# Patient Record
Sex: FEMALE | Race: WHITE | NOT HISPANIC OR LATINO | URBAN - METROPOLITAN AREA
[De-identification: names, ages, dates, MRNs, and addresses within clinical notes are randomized per-mention and may not be internally consistent; named-entity substitution may affect disease eponyms.]

---

## 2017-05-08 ENCOUNTER — EMERGENCY (EMERGENCY)
Facility: HOSPITAL | Age: 62
LOS: 1 days | Discharge: HOME | End: 2017-05-08
Attending: EMERGENCY MEDICINE
Payer: COMMERCIAL

## 2017-05-08 VITALS
DIASTOLIC BLOOD PRESSURE: 69 MMHG | SYSTOLIC BLOOD PRESSURE: 148 MMHG | OXYGEN SATURATION: 99 % | TEMPERATURE: 98 F | RESPIRATION RATE: 20 BRPM | HEIGHT: 65 IN | HEART RATE: 61 BPM | WEIGHT: 209 LBS

## 2017-05-08 DIAGNOSIS — R19.7 DIARRHEA, UNSPECIFIED: ICD-10-CM

## 2017-05-08 DIAGNOSIS — R11.0 NAUSEA: ICD-10-CM

## 2017-05-08 LAB
ALBUMIN SERPL ELPH-MCNC: 3.9 G/DL — SIGNIFICANT CHANGE UP (ref 3.5–5)
ALP SERPL-CCNC: 60 U/L — SIGNIFICANT CHANGE UP (ref 40–120)
ALT FLD-CCNC: 61 U/L DA — HIGH (ref 10–60)
ANION GAP SERPL CALC-SCNC: 9 MMOL/L — SIGNIFICANT CHANGE UP (ref 5–17)
AST SERPL-CCNC: 64 U/L — HIGH (ref 10–40)
BASOPHILS # BLD AUTO: 0.1 K/UL — SIGNIFICANT CHANGE UP (ref 0–0.2)
BASOPHILS NFR BLD AUTO: 1.5 % — SIGNIFICANT CHANGE UP (ref 0–2)
BILIRUB SERPL-MCNC: 0.8 MG/DL — SIGNIFICANT CHANGE UP (ref 0.2–1.2)
BUN SERPL-MCNC: 10 MG/DL — SIGNIFICANT CHANGE UP (ref 7–18)
CALCIUM SERPL-MCNC: 9.4 MG/DL — SIGNIFICANT CHANGE UP (ref 8.4–10.5)
CHLORIDE SERPL-SCNC: 105 MMOL/L — SIGNIFICANT CHANGE UP (ref 96–108)
CO2 SERPL-SCNC: 26 MMOL/L — SIGNIFICANT CHANGE UP (ref 22–31)
CREAT SERPL-MCNC: 1 MG/DL — SIGNIFICANT CHANGE UP (ref 0.5–1.3)
EOSINOPHIL # BLD AUTO: 0.1 K/UL — SIGNIFICANT CHANGE UP (ref 0–0.5)
EOSINOPHIL NFR BLD AUTO: 1.3 % — SIGNIFICANT CHANGE UP (ref 0–6)
GLUCOSE SERPL-MCNC: 77 MG/DL — SIGNIFICANT CHANGE UP (ref 70–99)
HCT VFR BLD CALC: 46.1 % — HIGH (ref 34.5–45)
HGB BLD-MCNC: 15.7 G/DL — HIGH (ref 11.5–15.5)
LIDOCAIN IGE QN: 152 U/L — SIGNIFICANT CHANGE UP (ref 73–393)
LYMPHOCYTES # BLD AUTO: 3.2 K/UL — SIGNIFICANT CHANGE UP (ref 1–3.3)
LYMPHOCYTES # BLD AUTO: 34.2 % — SIGNIFICANT CHANGE UP (ref 13–44)
MCHC RBC-ENTMCNC: 29.4 PG — SIGNIFICANT CHANGE UP (ref 27–34)
MCHC RBC-ENTMCNC: 34 GM/DL — SIGNIFICANT CHANGE UP (ref 32–36)
MCV RBC AUTO: 86.6 FL — SIGNIFICANT CHANGE UP (ref 80–100)
MONOCYTES # BLD AUTO: 0.7 K/UL — SIGNIFICANT CHANGE UP (ref 0–0.9)
MONOCYTES NFR BLD AUTO: 7.1 % — SIGNIFICANT CHANGE UP (ref 2–14)
NEUTROPHILS # BLD AUTO: 5.3 K/UL — SIGNIFICANT CHANGE UP (ref 1.8–7.4)
NEUTROPHILS NFR BLD AUTO: 55.9 % — SIGNIFICANT CHANGE UP (ref 43–77)
PLATELET # BLD AUTO: 310 K/UL — SIGNIFICANT CHANGE UP (ref 150–400)
POTASSIUM SERPL-MCNC: 3.8 MMOL/L — SIGNIFICANT CHANGE UP (ref 3.5–5.3)
POTASSIUM SERPL-SCNC: 3.8 MMOL/L — SIGNIFICANT CHANGE UP (ref 3.5–5.3)
PROT SERPL-MCNC: 8.4 G/DL — HIGH (ref 6–8.3)
RBC # BLD: 5.33 M/UL — HIGH (ref 3.8–5.2)
RBC # FLD: 11.3 % — SIGNIFICANT CHANGE UP (ref 10.3–14.5)
SODIUM SERPL-SCNC: 140 MMOL/L — SIGNIFICANT CHANGE UP (ref 135–145)
WBC # BLD: 9.5 K/UL — SIGNIFICANT CHANGE UP (ref 3.8–10.5)
WBC # FLD AUTO: 9.5 K/UL — SIGNIFICANT CHANGE UP (ref 3.8–10.5)

## 2017-05-08 PROCEDURE — 99284 EMERGENCY DEPT VISIT MOD MDM: CPT | Mod: 25

## 2017-05-08 PROCEDURE — 87493 C DIFF AMPLIFIED PROBE: CPT

## 2017-05-08 PROCEDURE — 96374 THER/PROPH/DIAG INJ IV PUSH: CPT

## 2017-05-08 PROCEDURE — 80053 COMPREHEN METABOLIC PANEL: CPT

## 2017-05-08 PROCEDURE — 74177 CT ABD & PELVIS W/CONTRAST: CPT | Mod: 26

## 2017-05-08 PROCEDURE — 74177 CT ABD & PELVIS W/CONTRAST: CPT

## 2017-05-08 PROCEDURE — 99284 EMERGENCY DEPT VISIT MOD MDM: CPT

## 2017-05-08 PROCEDURE — 85027 COMPLETE CBC AUTOMATED: CPT

## 2017-05-08 PROCEDURE — 83690 ASSAY OF LIPASE: CPT

## 2017-05-08 RX ORDER — METRONIDAZOLE 500 MG
1 TABLET ORAL
Qty: 20 | Refills: 0 | OUTPATIENT
Start: 2017-05-08 | End: 2017-05-13

## 2017-05-08 RX ORDER — CIPROFLOXACIN LACTATE 400MG/40ML
500 VIAL (ML) INTRAVENOUS ONCE
Qty: 0 | Refills: 0 | Status: COMPLETED | OUTPATIENT
Start: 2017-05-08 | End: 2017-05-08

## 2017-05-08 RX ORDER — METRONIDAZOLE 500 MG
1 TABLET ORAL
Qty: 20 | Refills: 0
Start: 2017-05-08 | End: 2017-05-13

## 2017-05-08 RX ORDER — METRONIDAZOLE 500 MG
500 TABLET ORAL ONCE
Qty: 0 | Refills: 0 | Status: COMPLETED | OUTPATIENT
Start: 2017-05-08 | End: 2017-05-08

## 2017-05-08 RX ORDER — SODIUM CHLORIDE 9 MG/ML
1000 INJECTION INTRAMUSCULAR; INTRAVENOUS; SUBCUTANEOUS ONCE
Qty: 0 | Refills: 0 | Status: COMPLETED | OUTPATIENT
Start: 2017-05-08 | End: 2017-05-08

## 2017-05-08 RX ORDER — SODIUM CHLORIDE 9 MG/ML
3 INJECTION INTRAMUSCULAR; INTRAVENOUS; SUBCUTANEOUS ONCE
Qty: 0 | Refills: 0 | Status: COMPLETED | OUTPATIENT
Start: 2017-05-08 | End: 2017-05-08

## 2017-05-08 RX ORDER — ONDANSETRON 8 MG/1
4 TABLET, FILM COATED ORAL ONCE
Qty: 0 | Refills: 0 | Status: COMPLETED | OUTPATIENT
Start: 2017-05-08 | End: 2017-05-08

## 2017-05-08 RX ORDER — MOXIFLOXACIN HYDROCHLORIDE TABLETS, 400 MG 400 MG/1
1 TABLET, FILM COATED ORAL
Qty: 10 | Refills: 0 | OUTPATIENT
Start: 2017-05-08 | End: 2017-05-13

## 2017-05-08 RX ORDER — MOXIFLOXACIN HYDROCHLORIDE TABLETS, 400 MG 400 MG/1
1 TABLET, FILM COATED ORAL
Qty: 10 | Refills: 0
Start: 2017-05-08 | End: 2017-05-13

## 2017-05-08 RX ADMIN — SODIUM CHLORIDE 1000 MILLILITER(S): 9 INJECTION INTRAMUSCULAR; INTRAVENOUS; SUBCUTANEOUS at 20:45

## 2017-05-08 RX ADMIN — Medication 500 MILLIGRAM(S): at 23:20

## 2017-05-08 RX ADMIN — ONDANSETRON 4 MILLIGRAM(S): 8 TABLET, FILM COATED ORAL at 20:45

## 2017-05-08 RX ADMIN — SODIUM CHLORIDE 3 MILLILITER(S): 9 INJECTION INTRAMUSCULAR; INTRAVENOUS; SUBCUTANEOUS at 20:45

## 2017-05-08 NOTE — ED PROVIDER NOTE - NS ED MD SCRIBE ATTENDING SCRIBE SECTIONS
PHYSICAL EXAM/DISPOSITION/VITAL SIGNS( Pullset)/HIV/HISTORY OF PRESENT ILLNESS/REVIEW OF SYSTEMS/PAST MEDICAL/SURGICAL/SOCIAL HISTORY

## 2017-05-08 NOTE — ED PROVIDER NOTE - CONDUCTED A DETAILED DISCUSSION WITH PATIENT AND/OR GUARDIAN REGARDING, MDM
return to ED if symptoms worsen, persist or questions arise lab results/need for outpatient follow-up/return to ED if symptoms worsen, persist or questions arise

## 2017-05-08 NOTE — ED PROVIDER NOTE - OBJECTIVE STATEMENT
61 y/o F w/ no significant PMHx presents to the ED c/o diarrhea x12 days. Pt also notes slight nausea. Pt notes she ate yogurt & a granola bar s/p work prior to Sx. Pt states 2 hours ago she noticed bleeding while making a BM & went to PMD who did rectal exam & found bright red blood in stool. Pt was then sent to ED for further evaluation. Pt denies fever, chills, abd pain or any other complaints. NKDA.

## 2017-05-08 NOTE — ED ADULT TRIAGE NOTE - CHIEF COMPLAINT QUOTE
c/o watery diarrhea x 5 days states that she took anti diarrhea got better but came back again sent by PMD for evaluation

## 2017-05-09 LAB
C DIFF BY PCR RESULT: SIGNIFICANT CHANGE UP
C DIFF TOX GENS STL QL NAA+PROBE: SIGNIFICANT CHANGE UP

## 2017-05-09 RX ORDER — CIPROFLOXACIN LACTATE 400MG/40ML
1 VIAL (ML) INTRAVENOUS
Qty: 10 | Refills: 0
Start: 2017-05-09 | End: 2017-05-14

## 2017-05-09 RX ORDER — METRONIDAZOLE 500 MG
1 TABLET ORAL
Qty: 20 | Refills: 0
Start: 2017-05-09 | End: 2017-05-14

## 2017-05-10 ENCOUNTER — TRANSCRIPTION ENCOUNTER (OUTPATIENT)
Age: 62
End: 2017-05-10

## 2018-02-06 ENCOUNTER — RESULT REVIEW (OUTPATIENT)
Age: 63
End: 2018-02-06

## 2018-07-07 ENCOUNTER — EMERGENCY (EMERGENCY)
Facility: HOSPITAL | Age: 63
LOS: 1 days | Discharge: HOME | End: 2018-07-07
Attending: EMERGENCY MEDICINE
Payer: COMMERCIAL

## 2018-07-07 VITALS
SYSTOLIC BLOOD PRESSURE: 138 MMHG | HEART RATE: 79 BPM | DIASTOLIC BLOOD PRESSURE: 82 MMHG | WEIGHT: 214.95 LBS | RESPIRATION RATE: 18 BRPM | HEIGHT: 65 IN | TEMPERATURE: 98 F | OXYGEN SATURATION: 97 %

## 2018-07-07 VITALS
TEMPERATURE: 99 F | DIASTOLIC BLOOD PRESSURE: 80 MMHG | SYSTOLIC BLOOD PRESSURE: 137 MMHG | RESPIRATION RATE: 17 BRPM | HEART RATE: 77 BPM | OXYGEN SATURATION: 98 %

## 2018-07-07 LAB
ALBUMIN SERPL ELPH-MCNC: 3.7 G/DL — SIGNIFICANT CHANGE UP (ref 3.5–5)
ALP SERPL-CCNC: 57 U/L — SIGNIFICANT CHANGE UP (ref 40–120)
ALT FLD-CCNC: 58 U/L DA — SIGNIFICANT CHANGE UP (ref 10–60)
ANION GAP SERPL CALC-SCNC: 9 MMOL/L — SIGNIFICANT CHANGE UP (ref 5–17)
APPEARANCE UR: CLEAR — SIGNIFICANT CHANGE UP
AST SERPL-CCNC: 57 U/L — HIGH (ref 10–40)
BASOPHILS # BLD AUTO: 0.2 K/UL — SIGNIFICANT CHANGE UP (ref 0–0.2)
BASOPHILS NFR BLD AUTO: 1.8 % — SIGNIFICANT CHANGE UP (ref 0–2)
BILIRUB SERPL-MCNC: 0.7 MG/DL — SIGNIFICANT CHANGE UP (ref 0.2–1.2)
BILIRUB UR-MCNC: NEGATIVE — SIGNIFICANT CHANGE UP
BUN SERPL-MCNC: 10 MG/DL — SIGNIFICANT CHANGE UP (ref 7–18)
CALCIUM SERPL-MCNC: 8.9 MG/DL — SIGNIFICANT CHANGE UP (ref 8.4–10.5)
CHLORIDE SERPL-SCNC: 102 MMOL/L — SIGNIFICANT CHANGE UP (ref 96–108)
CO2 SERPL-SCNC: 25 MMOL/L — SIGNIFICANT CHANGE UP (ref 22–31)
COLOR SPEC: YELLOW — SIGNIFICANT CHANGE UP
CREAT SERPL-MCNC: 1.07 MG/DL — SIGNIFICANT CHANGE UP (ref 0.5–1.3)
DIFF PNL FLD: ABNORMAL
EOSINOPHIL # BLD AUTO: 0.2 K/UL — SIGNIFICANT CHANGE UP (ref 0–0.5)
EOSINOPHIL NFR BLD AUTO: 2.4 % — SIGNIFICANT CHANGE UP (ref 0–6)
GLUCOSE SERPL-MCNC: 90 MG/DL — SIGNIFICANT CHANGE UP (ref 70–99)
GLUCOSE UR QL: NEGATIVE — SIGNIFICANT CHANGE UP
HCT VFR BLD CALC: 45.7 % — HIGH (ref 34.5–45)
HGB BLD-MCNC: 15.2 G/DL — SIGNIFICANT CHANGE UP (ref 11.5–15.5)
KETONES UR-MCNC: ABNORMAL
LACTATE SERPL-SCNC: 1 MMOL/L — SIGNIFICANT CHANGE UP (ref 0.7–2)
LEUKOCYTE ESTERASE UR-ACNC: NEGATIVE — SIGNIFICANT CHANGE UP
LIDOCAIN IGE QN: 480 U/L — HIGH (ref 73–393)
LYMPHOCYTES # BLD AUTO: 2.5 K/UL — SIGNIFICANT CHANGE UP (ref 1–3.3)
LYMPHOCYTES # BLD AUTO: 27 % — SIGNIFICANT CHANGE UP (ref 13–44)
MCHC RBC-ENTMCNC: 28.4 PG — SIGNIFICANT CHANGE UP (ref 27–34)
MCHC RBC-ENTMCNC: 33.2 GM/DL — SIGNIFICANT CHANGE UP (ref 32–36)
MCV RBC AUTO: 85.6 FL — SIGNIFICANT CHANGE UP (ref 80–100)
MONOCYTES # BLD AUTO: 0.8 K/UL — SIGNIFICANT CHANGE UP (ref 0–0.9)
MONOCYTES NFR BLD AUTO: 8.9 % — SIGNIFICANT CHANGE UP (ref 2–14)
NEUTROPHILS # BLD AUTO: 5.6 K/UL — SIGNIFICANT CHANGE UP (ref 1.8–7.4)
NEUTROPHILS NFR BLD AUTO: 59.9 % — SIGNIFICANT CHANGE UP (ref 43–77)
NITRITE UR-MCNC: NEGATIVE — SIGNIFICANT CHANGE UP
PH UR: 6 — SIGNIFICANT CHANGE UP (ref 5–8)
PLATELET # BLD AUTO: 362 K/UL — SIGNIFICANT CHANGE UP (ref 150–400)
POTASSIUM SERPL-MCNC: 4 MMOL/L — SIGNIFICANT CHANGE UP (ref 3.5–5.3)
POTASSIUM SERPL-SCNC: 4 MMOL/L — SIGNIFICANT CHANGE UP (ref 3.5–5.3)
PROT SERPL-MCNC: 8.1 G/DL — SIGNIFICANT CHANGE UP (ref 6–8.3)
PROT UR-MCNC: 15
RBC # BLD: 5.34 M/UL — HIGH (ref 3.8–5.2)
RBC # FLD: 11.2 % — SIGNIFICANT CHANGE UP (ref 10.3–14.5)
SODIUM SERPL-SCNC: 136 MMOL/L — SIGNIFICANT CHANGE UP (ref 135–145)
SP GR SPEC: 1.01 — SIGNIFICANT CHANGE UP (ref 1.01–1.02)
UROBILINOGEN FLD QL: NEGATIVE — SIGNIFICANT CHANGE UP
WBC # BLD: 9.4 K/UL — SIGNIFICANT CHANGE UP (ref 3.8–10.5)
WBC # FLD AUTO: 9.4 K/UL — SIGNIFICANT CHANGE UP (ref 3.8–10.5)

## 2018-07-07 PROCEDURE — 80053 COMPREHEN METABOLIC PANEL: CPT

## 2018-07-07 PROCEDURE — 99284 EMERGENCY DEPT VISIT MOD MDM: CPT

## 2018-07-07 PROCEDURE — 83605 ASSAY OF LACTIC ACID: CPT

## 2018-07-07 PROCEDURE — 85027 COMPLETE CBC AUTOMATED: CPT

## 2018-07-07 PROCEDURE — 96374 THER/PROPH/DIAG INJ IV PUSH: CPT

## 2018-07-07 PROCEDURE — 99284 EMERGENCY DEPT VISIT MOD MDM: CPT | Mod: 25

## 2018-07-07 PROCEDURE — 81001 URINALYSIS AUTO W/SCOPE: CPT

## 2018-07-07 PROCEDURE — 87086 URINE CULTURE/COLONY COUNT: CPT

## 2018-07-07 PROCEDURE — 83690 ASSAY OF LIPASE: CPT

## 2018-07-07 RX ORDER — LOPERAMIDE HCL 2 MG
4 TABLET ORAL ONCE
Qty: 0 | Refills: 0 | Status: COMPLETED | OUTPATIENT
Start: 2018-07-07 | End: 2018-07-07

## 2018-07-07 RX ORDER — ONDANSETRON 8 MG/1
4 TABLET, FILM COATED ORAL ONCE
Qty: 0 | Refills: 0 | Status: COMPLETED | OUTPATIENT
Start: 2018-07-07 | End: 2018-07-07

## 2018-07-07 RX ORDER — MOXIFLOXACIN HYDROCHLORIDE TABLETS, 400 MG 400 MG/1
1 TABLET, FILM COATED ORAL
Qty: 6 | Refills: 0
Start: 2018-07-07 | End: 2018-07-09

## 2018-07-07 RX ORDER — SODIUM CHLORIDE 9 MG/ML
3 INJECTION INTRAMUSCULAR; INTRAVENOUS; SUBCUTANEOUS ONCE
Qty: 0 | Refills: 0 | Status: COMPLETED | OUTPATIENT
Start: 2018-07-07 | End: 2018-07-07

## 2018-07-07 RX ORDER — LOPERAMIDE HCL 2 MG
1 TABLET ORAL
Qty: 30 | Refills: 0
Start: 2018-07-07 | End: 2018-07-11

## 2018-07-07 RX ORDER — SODIUM CHLORIDE 9 MG/ML
1000 INJECTION INTRAMUSCULAR; INTRAVENOUS; SUBCUTANEOUS ONCE
Qty: 0 | Refills: 0 | Status: COMPLETED | OUTPATIENT
Start: 2018-07-07 | End: 2018-07-07

## 2018-07-07 RX ORDER — CIPROFLOXACIN LACTATE 400MG/40ML
500 VIAL (ML) INTRAVENOUS ONCE
Qty: 0 | Refills: 0 | Status: COMPLETED | OUTPATIENT
Start: 2018-07-07 | End: 2018-07-07

## 2018-07-07 RX ADMIN — SODIUM CHLORIDE 1000 MILLILITER(S): 9 INJECTION INTRAMUSCULAR; INTRAVENOUS; SUBCUTANEOUS at 19:13

## 2018-07-07 RX ADMIN — ONDANSETRON 4 MILLIGRAM(S): 8 TABLET, FILM COATED ORAL at 17:49

## 2018-07-07 RX ADMIN — Medication 500 MILLIGRAM(S): at 19:41

## 2018-07-07 RX ADMIN — SODIUM CHLORIDE 3 MILLILITER(S): 9 INJECTION INTRAMUSCULAR; INTRAVENOUS; SUBCUTANEOUS at 17:51

## 2018-07-07 RX ADMIN — SODIUM CHLORIDE 1000 MILLILITER(S): 9 INJECTION INTRAMUSCULAR; INTRAVENOUS; SUBCUTANEOUS at 17:49

## 2018-07-07 RX ADMIN — Medication 4 MILLIGRAM(S): at 19:51

## 2018-07-07 NOTE — ED PROVIDER NOTE - MEDICAL DECISION MAKING DETAILS
62 y/o F pt presents with diarrhea. Will check labs, will hydrate, and given length of sx's, will likely treat for traveler's diarrhea. Abdomen is soft and non-tender. No C. diff risk factors.

## 2018-07-07 NOTE — ED ADULT NURSE NOTE - OBJECTIVE STATEMENT
PT P/W DIARRHEA AND NAUSEA X 8 DAYS PT P/W DIARRHEA AND NAUSEA X 8 DAYS. PT TRAVELED TO [facility] AND [facility] 1WEEK AGO

## 2018-07-07 NOTE — ED PROVIDER NOTE - OBJECTIVE STATEMENT
62 y/o F pt with no PMHx and no PSHx sent by PMD to ED for evaluation of diarrhea for the past x8 days. Pt also reports associated nausea and vomiting for the first x2 days of sx's, which has since resolved. Pt describes diarrhea as non-bloody. Per pt, pt has taken Imodium at home with no relief of sx's. Pt notes recently returning to the [place] from [place]. Pt denies fever, chills, abdominal pain, or any other complaints. Pt also denies recent Abx's use. NKDA.

## 2018-07-08 LAB
CULTURE RESULTS: SIGNIFICANT CHANGE UP
SPECIMEN SOURCE: SIGNIFICANT CHANGE UP

## 2019-02-27 ENCOUNTER — RESULT REVIEW (OUTPATIENT)
Age: 64
End: 2019-02-27

## 2019-03-01 ENCOUNTER — TRANSCRIPTION ENCOUNTER (OUTPATIENT)
Age: 64
End: 2019-03-01

## 2019-03-29 ENCOUNTER — APPOINTMENT (OUTPATIENT)
Dept: OBGYN | Facility: CLINIC | Age: 64
End: 2019-03-29

## 2019-03-29 VITALS
HEIGHT: 65 IN | SYSTOLIC BLOOD PRESSURE: 129 MMHG | HEART RATE: 64 BPM | DIASTOLIC BLOOD PRESSURE: 79 MMHG | BODY MASS INDEX: 31.82 KG/M2 | WEIGHT: 191 LBS

## 2019-04-04 ENCOUNTER — APPOINTMENT (OUTPATIENT)
Dept: UROGYNECOLOGY | Facility: CLINIC | Age: 64
End: 2019-04-04
Payer: COMMERCIAL

## 2019-04-04 VITALS
SYSTOLIC BLOOD PRESSURE: 156 MMHG | WEIGHT: 191 LBS | HEIGHT: 65 IN | BODY MASS INDEX: 31.82 KG/M2 | DIASTOLIC BLOOD PRESSURE: 89 MMHG | HEART RATE: 60 BPM

## 2019-04-04 DIAGNOSIS — Z78.9 OTHER SPECIFIED HEALTH STATUS: ICD-10-CM

## 2019-04-04 DIAGNOSIS — Z86.39 PERSONAL HISTORY OF OTHER ENDOCRINE, NUTRITIONAL AND METABOLIC DISEASE: ICD-10-CM

## 2019-04-04 DIAGNOSIS — Z87.19 PERSONAL HISTORY OF OTHER DISEASES OF THE DIGESTIVE SYSTEM: ICD-10-CM

## 2019-04-04 DIAGNOSIS — Z82.49 FAMILY HISTORY OF ISCHEMIC HEART DISEASE AND OTHER DISEASES OF THE CIRCULATORY SYSTEM: ICD-10-CM

## 2019-04-04 DIAGNOSIS — Z86.79 PERSONAL HISTORY OF OTHER DISEASES OF THE CIRCULATORY SYSTEM: ICD-10-CM

## 2019-04-04 DIAGNOSIS — Z80.3 FAMILY HISTORY OF MALIGNANT NEOPLASM OF BREAST: ICD-10-CM

## 2019-04-04 DIAGNOSIS — K46.9 UNSPECIFIED ABDOMINAL HERNIA W/OUT OBSTRUCTION OR GANGRENE: ICD-10-CM

## 2019-04-04 LAB
BILIRUB UR QL STRIP: NORMAL
CLARITY UR: CLEAR
COLLECTION METHOD: NORMAL
GLUCOSE UR-MCNC: NORMAL
HCG UR QL: 0.2 EU/DL
HGB UR QL STRIP.AUTO: NORMAL
KETONES UR-MCNC: NORMAL
LEUKOCYTE ESTERASE UR QL STRIP: NORMAL
NITRITE UR QL STRIP: NORMAL
PH UR STRIP: 6
PROT UR STRIP-MCNC: NORMAL
SP GR UR STRIP: 1.01

## 2019-04-04 PROCEDURE — 51701 INSERT BLADDER CATHETER: CPT

## 2019-04-04 PROCEDURE — 81003 URINALYSIS AUTO W/O SCOPE: CPT | Mod: QW

## 2019-04-04 PROCEDURE — 99204 OFFICE O/P NEW MOD 45 MIN: CPT | Mod: 25

## 2019-04-04 RX ORDER — LEVOTHYROXINE SODIUM 112 UG/1
112 TABLET ORAL
Refills: 0 | Status: ACTIVE | COMMUNITY

## 2019-04-04 RX ORDER — ATENOLOL 50 MG/1
50 TABLET ORAL
Refills: 0 | Status: ACTIVE | COMMUNITY

## 2019-04-04 RX ORDER — SIMVASTATIN 80 MG/1
TABLET, FILM COATED ORAL
Refills: 0 | Status: ACTIVE | COMMUNITY

## 2019-04-04 NOTE — LETTER BODY
[Dear  ___] : Dear  [unfilled], [Dear  ___] : Dear ~TAWANA, [I had the pleasure of evaluating your patient, [unfilled]. Thank you for referring Ms. [unfilled] for consultation for ___] : I had the pleasure of evaluating your patient, [unfilled]. Thank you for referring Ms. [unfilled] for consultation for [unfilled]. [Attached please find my note.] : Attached please find my note. [Thank you very much for allowing me to participate in the care of this patient. If you have any questions, please do not hesitate to contact me] : Thank you very much for allowing me to participate in the care of this patient. If you have any questions, please do not hesitate to contact me.

## 2019-04-04 NOTE — HISTORY OF PRESENT ILLNESS
[Uterine Prolapse] : daily [Rectal Prolapse] : none [Unable To Restrain Bowel Movement] : none [Urinary Frequency] : none [Feelings Of Urinary Urgency] : daily [] : years ago [Urinary Tract Infection] : none [Incomplete Emptying Of Stool] : none [de-identified] : few [FreeTextEntry4] : occasionally [de-identified] : 2 [FreeTextEntry5] : worse in the last 6 mos [de-identified] : 2 [de-identified] : sometimes with laugh, cough, sneeze [de-identified] : couple [de-identified] : wears 2-3 pads/day, 1 night [de-identified] : 1-2-3 [de-identified] : 2-3 [de-identified] : not in last 6 mos due to POP [FreeTextEntry1] : ROS as per questionnaire.\par

## 2019-04-04 NOTE — PHYSICAL EXAM
[No Acute Distress] : in no acute distress [Well developed] : well developed [Well Nourished] : ~L well nourished [Oriented x3] : oriented to person, place, and time [Bulbocavernous] : bulbocavernous was present [Anal Reflex] : the anal reflex was present [Warm and Dry] : was warm and dry to touch [Normal Gait] : gait was normal [Vulvar Atrophy] : vulvar atrophy [Normal Appearance] : general appearance was normal [Atrophy] : atrophy [Normal] : normal [Uterine Adnexae] : were not tender and not enlarged [Normal rectal exam] : was normal [Rectocele] : a rectocele [Cystocele] : a cystocele [Uterine Prolapse] : uterine prolapse [Aa ____] : Aa [unfilled] [Ba ____] : Ba [unfilled] [C ____] : C [unfilled] [GH ____] : GH [unfilled] [PB ____] : PB [unfilled] [TVL ____] : TVL  [unfilled] [Ap ____] : Ap [unfilled] [Bp ____] : Bp [unfilled] [D ____] : D [unfilled] [Tenderness] : ~T no ~M abdominal tenderness observed [Distended] : not distended [H/Smegaly] : no hepatosplenomegaly [Inguinal LAD] : no adenopathy was noted in the inguinal lymph nodes [Post Void Residual ____ml] : post void residual via catheterization was [unfilled] ml [FreeTextEntry3] : + hypermobility

## 2019-04-04 NOTE — PROCEDURE
[FreeTextEntry1] : A catheterized urine was performed to rule out urinary tract infection and/or retention.

## 2019-04-04 NOTE — DISCUSSION/SUMMARY
[FreeTextEntry1] : I reviewed the above findings with the patient. Treatment options for the prolapse were discussed and included doing nothing, Kegel exercises and behavioral modification, a pessary, or surgical correction.Surgically we discussed the abdominal vs the vaginal routes. Abdominally we discussed a hysterectomy and a sacral colpopexy either via laparotomy or laparoscopically and robotically.  Vaginally we discussed a vaginal hysterectomy, uterosacral suspension, and anterior repair versus vaginal mesh reconstruction. We discussed the FDA warning on transvaginal mesh. Surgically we discussed hysteropexy as well as the use of biologics.  We also discussed the CAROLE and surgical and non-surgical treatment options.  She is interested in surgical correction correction and I have asked her to return for urodynamic testing to further evaluate her urinary complaints.  I have also asked her to go for a TV ultrasound due to the PMB.  IUGA pt info on POP and CAROLE given. We will further discuss tx options after her evaluation is completed. All questions answered.

## 2019-04-09 ENCOUNTER — MESSAGE (OUTPATIENT)
Age: 64
End: 2019-04-09

## 2019-04-29 ENCOUNTER — APPOINTMENT (OUTPATIENT)
Dept: UROGYNECOLOGY | Facility: CLINIC | Age: 64
End: 2019-04-29
Payer: COMMERCIAL

## 2019-04-29 ENCOUNTER — OUTPATIENT (OUTPATIENT)
Dept: OUTPATIENT SERVICES | Facility: HOSPITAL | Age: 64
LOS: 1 days | End: 2019-04-29

## 2019-04-29 PROCEDURE — 51729 CYSTOMETROGRAM W/VP&UP: CPT | Mod: 26

## 2019-04-29 PROCEDURE — 51797 INTRAABDOMINAL PRESSURE TEST: CPT | Mod: 26

## 2019-04-29 PROCEDURE — 51784 ANAL/URINARY MUSCLE STUDY: CPT | Mod: 26

## 2019-05-01 ENCOUNTER — APPOINTMENT (OUTPATIENT)
Dept: UROGYNECOLOGY | Facility: CLINIC | Age: 64
End: 2019-05-01
Payer: COMMERCIAL

## 2019-05-01 PROCEDURE — 58100 BIOPSY OF UTERUS LINING: CPT

## 2019-05-08 ENCOUNTER — APPOINTMENT (OUTPATIENT)
Dept: UROGYNECOLOGY | Facility: CLINIC | Age: 64
End: 2019-05-08
Payer: COMMERCIAL

## 2019-05-08 PROCEDURE — 99214 OFFICE O/P EST MOD 30 MIN: CPT

## 2019-05-08 NOTE — HISTORY OF PRESENT ILLNESS
[FreeTextEntry1] : Patient returns today with her  for followup on her pelvic organ prolapse and stress urinary incontinence. Review of symptoms was unchanged from initial visit in April. On exam in April she had POPQ Stage III pelvic organ prolapse with uterovaginal prolapse cystocele and rectocele. She had a transvaginal ultrasound and April which revealed a uterus 6.9 x 3.7 x 4.7 cm with an endometrial lining of 7 mm. She had an endometrial biopsy earlier this month which revealed atrophic endometrium she underwent urodynamics in April which revealed stress urinary incontinence. I reviewed these findings with them. Treatment options for the prolapse were discussed and included doing nothing, Kegel exercises and behavioral modification, a pessary, or surgical correction.Surgically we discussed the abdominal vs the vaginal routes. Abdominally we discussed a hysterectomy and a sacral colpopexy   laparoscopically and robotically.  Vaginally we discussed a vaginal hysterectomy, uterosacral suspension, and anterior/posterior repair. Surgically we discussed hysteropexy as well as the use of biologics. Risks and benefits of the procedures were discussed. With sacral colpopexy she has a history of a laparoscopic cholecystectomy so we discussed the possibility of increased morbidity from scar tissue. We discussed the possibility of mesh exposure. She is interested in the proceed with the laparoscopic sites robotically with a hysterectomy and sacral colpopexy. We discussed the stress incontinence as well surgical and nonsurgical treatment options and she wishes to proceed with a mid urethral sling at the time of surgery. We discussed the possibility of failure in going home with a catheter. All questions were answered.\par

## 2019-05-08 NOTE — DISCUSSION/SUMMARY
[FreeTextEntry1] : She wishes to schedule a laparoscopic/robotic hysterectomy and sacral colpopexy for the prolapse and mid urethral sling for stress incontinence. IUGA patient's information on sacral colpopexy and a mid urethral sling was given to her.

## 2019-05-20 ENCOUNTER — APPOINTMENT (OUTPATIENT)
Dept: UROGYNECOLOGY | Facility: CLINIC | Age: 64
End: 2019-05-20
Payer: COMMERCIAL

## 2019-05-20 DIAGNOSIS — N36.41 HYPERMOBILITY OF URETHRA: ICD-10-CM

## 2019-05-20 PROCEDURE — 99214 OFFICE O/P EST MOD 30 MIN: CPT | Mod: 25

## 2019-05-20 PROCEDURE — 51701 INSERT BLADDER CATHETER: CPT

## 2019-05-20 NOTE — HISTORY OF PRESENT ILLNESS
[FreeTextEntry1] : 63yo with Stage 3 POP, uterovaginal prolapse, midline cystocele and rectocele and GSUI presents for preop counseling. \par \par PMH: Hypothyroidism s/p radioablation, HTN, HLD\par PSH: laparoscopic cholecystectomy, cervical lymph node excision\par Meds: synthroid, atenolol, simvastatin\par All: NKDA\par \par U/S: uterus 6.9x3.7x4.7xm with 7mm ET\par EMB: atrophic endometrium \par UDS: +CST at all volumes 's, no DI\par PaP: negative 2/12/2018\par Med clearance: pending (6/3/19)

## 2019-05-20 NOTE — PHYSICAL EXAM
[No Acute Distress] : in no acute distress [Well developed] : well developed [Well Nourished] : ~L well nourished [Oriented x3] : oriented to person, place, and time [Warm and Dry] : was warm and dry to touch [Normal Gait] : gait was normal [Vulvar Atrophy] : vulvar atrophy [Labia Majora] : were normal [Normal Appearance] : general appearance was normal [Atrophy] : atrophy [Rectocele] : a rectocele [Cystocele] : a cystocele [Uterine Prolapse] : uterine prolapse [Aa ____] : Aa [unfilled] [Ba ____] : Ba [unfilled] [C ____] : C [unfilled] [GH ____] : GH [unfilled] [PB ____] : PB [unfilled] [TVL ____] : TVL  [unfilled] [Ap ____] : Ap [unfilled] [Bp ____] : Bp [unfilled] [D ____] : D [unfilled] [Normal] : normal [Uterine Adnexae] : were not tender and not enlarged [Post Void Residual ____ml] : post void residual via catheterization was [unfilled] ml [Anxiety] : patient is not anxious [Tenderness] : ~T no ~M abdominal tenderness observed [Distended] : not distended [H/Smegaly] : no hepatosplenomegaly [Inguinal LAD] : no adenopathy was noted in the inguinal lymph nodes [FreeTextEntry3] : + hypermobility

## 2019-05-20 NOTE — DISCUSSION/SUMMARY
[FreeTextEntry1] : Romy presents for followup, she has Stage 3 POP, midline cystocele, uterovaginal prolapse, rectocele and stress urinary incontinence and desires surgical management. I reviewed the above findings with the patient as well surgical approaches including vaginal and abdominal, mesh and native tissue repairs, and nonsurgical treatment options for the prolapse and stress incontinence and she wishes to proceed with surgical correction via robotic assisted laparoscopic hysterectomy and sacral colpopexy for the prolapse and mid urethral sling for stress incontinence. Risks and benefits of the BSO were discussed and she wishes to proceed with a bilateral salpingectomy and oophorectomy due to a family history of breast cancer (mother and sister). We discussed the possibility of a laparotomy at the time of surgical correction. We discussed the placement of a suburethral sling at time of surgical correction for prolapse. Risks and benefits of a TOT sling, mini-sling versus a retropubic sling were discussed and included but not limited to bladder and bowel perforation, voiding dysfunction, and groin pain. She wishes to proceed with a retropubic sling. She is aware surgery is not meant to correct OAB symptoms. The FDA notification on mesh was discussed.  Risks and benefits of the procedure were discussed and included but not limited to infection, bleeding, including transfusion, surrounding organ or tissue injury (bladder, rectum, bowel, urethra, ureters, nerves vessels or muscles), failure meaning recurrent prolapse, leaking, voiding dysfunction, needing to go home with a catheter, pain with sex, blood clots, and anesthesia. In addition we discussed risks related to mesh including but not limited to infection, erosion and chronic inflammation, acute and chronic pain, pain with intercourse (both of which may be refractory to treatment). She is aware that the mesh used in her surgery is a permanent mesh. We discussed the possibility of going home with a catheter. Hospital stay, postoperative restrictions, and anesthesia were discussed. All risks were explained in layman’s terms All questions were answered and she wishes to proceed with surgical correction.  Consent was signed in the office for robotic assisted supracervical hysterectomy, bilateral salpingectomy and oophorectomy, sacral colpopexy, midurethral sling, cystoscopy possible laparotomy, possible oophorectomy. We discussed the risks and benefits of using Periscope mesh as well as the 60 minutes report regarding the resin.  Sarbari response to patients was given to her.  She wishes to proceed with the use of the BSC mesh in the surgery. I was able to answer all her questions.\par \par \par

## 2019-05-22 ENCOUNTER — RESULT REVIEW (OUTPATIENT)
Age: 64
End: 2019-05-22

## 2019-05-22 LAB — BACTERIA UR CULT: NORMAL

## 2019-05-26 ENCOUNTER — INPATIENT (INPATIENT)
Facility: HOSPITAL | Age: 64
LOS: 2 days | Discharge: HOME | DRG: 384 | End: 2019-05-29
Attending: INTERNAL MEDICINE | Admitting: INTERNAL MEDICINE
Payer: COMMERCIAL

## 2019-05-26 VITALS
TEMPERATURE: 97 F | WEIGHT: 190.04 LBS | HEIGHT: 65 IN | SYSTOLIC BLOOD PRESSURE: 118 MMHG | OXYGEN SATURATION: 100 % | DIASTOLIC BLOOD PRESSURE: 72 MMHG | HEART RATE: 62 BPM | RESPIRATION RATE: 16 BRPM

## 2019-05-26 DIAGNOSIS — E03.9 HYPOTHYROIDISM, UNSPECIFIED: ICD-10-CM

## 2019-05-26 DIAGNOSIS — R07.9 CHEST PAIN, UNSPECIFIED: ICD-10-CM

## 2019-05-26 DIAGNOSIS — Z90.49 ACQUIRED ABSENCE OF OTHER SPECIFIED PARTS OF DIGESTIVE TRACT: ICD-10-CM

## 2019-05-26 DIAGNOSIS — K85.90 ACUTE PANCREATITIS WITHOUT NECROSIS OR INFECTION, UNSPECIFIED: ICD-10-CM

## 2019-05-26 DIAGNOSIS — Z29.9 ENCOUNTER FOR PROPHYLACTIC MEASURES, UNSPECIFIED: ICD-10-CM

## 2019-05-26 DIAGNOSIS — Z90.49 ACQUIRED ABSENCE OF OTHER SPECIFIED PARTS OF DIGESTIVE TRACT: Chronic | ICD-10-CM

## 2019-05-26 DIAGNOSIS — I10 ESSENTIAL (PRIMARY) HYPERTENSION: ICD-10-CM

## 2019-05-26 DIAGNOSIS — E78.5 HYPERLIPIDEMIA, UNSPECIFIED: ICD-10-CM

## 2019-05-26 DIAGNOSIS — N20.0 CALCULUS OF KIDNEY: ICD-10-CM

## 2019-05-26 LAB
ALBUMIN SERPL ELPH-MCNC: 3.5 G/DL — SIGNIFICANT CHANGE UP (ref 3.5–5)
ALP SERPL-CCNC: 65 U/L — SIGNIFICANT CHANGE UP (ref 40–120)
ALT FLD-CCNC: 39 U/L DA — SIGNIFICANT CHANGE UP (ref 10–60)
ANION GAP SERPL CALC-SCNC: 8 MMOL/L — SIGNIFICANT CHANGE UP (ref 5–17)
AST SERPL-CCNC: 56 U/L — HIGH (ref 10–40)
BASOPHILS # BLD AUTO: 0.04 K/UL — SIGNIFICANT CHANGE UP (ref 0–0.2)
BASOPHILS NFR BLD AUTO: 0.4 % — SIGNIFICANT CHANGE UP (ref 0–2)
BILIRUB SERPL-MCNC: 0.6 MG/DL — SIGNIFICANT CHANGE UP (ref 0.2–1.2)
BUN SERPL-MCNC: 20 MG/DL — HIGH (ref 7–18)
CALCIUM SERPL-MCNC: 8.8 MG/DL — SIGNIFICANT CHANGE UP (ref 8.4–10.5)
CHLORIDE SERPL-SCNC: 107 MMOL/L — SIGNIFICANT CHANGE UP (ref 96–108)
CHOLEST SERPL-MCNC: 182 MG/DL — SIGNIFICANT CHANGE UP (ref 10–199)
CO2 SERPL-SCNC: 27 MMOL/L — SIGNIFICANT CHANGE UP (ref 22–31)
CREAT SERPL-MCNC: 0.96 MG/DL — SIGNIFICANT CHANGE UP (ref 0.5–1.3)
EOSINOPHIL # BLD AUTO: 1.01 K/UL — HIGH (ref 0–0.5)
EOSINOPHIL NFR BLD AUTO: 9.5 % — HIGH (ref 0–6)
GLUCOSE SERPL-MCNC: 110 MG/DL — HIGH (ref 70–99)
HCT VFR BLD CALC: 39.9 % — SIGNIFICANT CHANGE UP (ref 34.5–45)
HDLC SERPL-MCNC: 60 MG/DL — SIGNIFICANT CHANGE UP
HGB BLD-MCNC: 13.5 G/DL — SIGNIFICANT CHANGE UP (ref 11.5–15.5)
IMM GRANULOCYTES NFR BLD AUTO: 0.3 % — SIGNIFICANT CHANGE UP (ref 0–1.5)
LIDOCAIN IGE QN: 1707 U/L — HIGH (ref 73–393)
LIPID PNL WITH DIRECT LDL SERPL: 106 MG/DL — SIGNIFICANT CHANGE UP
LYMPHOCYTES # BLD AUTO: 2.7 K/UL — SIGNIFICANT CHANGE UP (ref 1–3.3)
LYMPHOCYTES # BLD AUTO: 25.4 % — SIGNIFICANT CHANGE UP (ref 13–44)
MCHC RBC-ENTMCNC: 29.7 PG — SIGNIFICANT CHANGE UP (ref 27–34)
MCHC RBC-ENTMCNC: 33.8 GM/DL — SIGNIFICANT CHANGE UP (ref 32–36)
MCV RBC AUTO: 87.9 FL — SIGNIFICANT CHANGE UP (ref 80–100)
MONOCYTES # BLD AUTO: 0.62 K/UL — SIGNIFICANT CHANGE UP (ref 0–0.9)
MONOCYTES NFR BLD AUTO: 5.8 % — SIGNIFICANT CHANGE UP (ref 2–14)
NEUTROPHILS # BLD AUTO: 6.25 K/UL — SIGNIFICANT CHANGE UP (ref 1.8–7.4)
NEUTROPHILS NFR BLD AUTO: 58.6 % — SIGNIFICANT CHANGE UP (ref 43–77)
NRBC # BLD: 0 /100 WBCS — SIGNIFICANT CHANGE UP (ref 0–0)
PLATELET # BLD AUTO: 238 K/UL — SIGNIFICANT CHANGE UP (ref 150–400)
POTASSIUM SERPL-MCNC: 3.4 MMOL/L — LOW (ref 3.5–5.3)
POTASSIUM SERPL-SCNC: 3.4 MMOL/L — LOW (ref 3.5–5.3)
PROT SERPL-MCNC: 7.6 G/DL — SIGNIFICANT CHANGE UP (ref 6–8.3)
RBC # BLD: 4.54 M/UL — SIGNIFICANT CHANGE UP (ref 3.8–5.2)
RBC # FLD: 12.4 % — SIGNIFICANT CHANGE UP (ref 10.3–14.5)
SODIUM SERPL-SCNC: 142 MMOL/L — SIGNIFICANT CHANGE UP (ref 135–145)
TOTAL CHOLESTEROL/HDL RATIO MEASUREMENT: 3 RATIO — LOW (ref 3.3–7.1)
TRIGL SERPL-MCNC: 81 MG/DL — SIGNIFICANT CHANGE UP (ref 10–149)
TROPONIN I SERPL-MCNC: <0.015 NG/ML — SIGNIFICANT CHANGE UP (ref 0–0.04)
TROPONIN I SERPL-MCNC: <0.015 NG/ML — SIGNIFICANT CHANGE UP (ref 0–0.04)
WBC # BLD: 10.65 K/UL — HIGH (ref 3.8–10.5)
WBC # FLD AUTO: 10.65 K/UL — HIGH (ref 3.8–10.5)

## 2019-05-26 PROCEDURE — 93010 ELECTROCARDIOGRAM REPORT: CPT

## 2019-05-26 PROCEDURE — 99223 1ST HOSP IP/OBS HIGH 75: CPT | Mod: GC

## 2019-05-26 PROCEDURE — 76705 ECHO EXAM OF ABDOMEN: CPT | Mod: 26

## 2019-05-26 PROCEDURE — 71046 X-RAY EXAM CHEST 2 VIEWS: CPT | Mod: 26

## 2019-05-26 PROCEDURE — 99285 EMERGENCY DEPT VISIT HI MDM: CPT

## 2019-05-26 PROCEDURE — 74177 CT ABD & PELVIS W/CONTRAST: CPT | Mod: 26

## 2019-05-26 RX ORDER — POTASSIUM CHLORIDE 20 MEQ
40 PACKET (EA) ORAL ONCE
Refills: 0 | Status: DISCONTINUED | OUTPATIENT
Start: 2019-05-26 | End: 2019-05-26

## 2019-05-26 RX ORDER — ASPIRIN/CALCIUM CARB/MAGNESIUM 324 MG
81 TABLET ORAL DAILY
Refills: 0 | Status: DISCONTINUED | OUTPATIENT
Start: 2019-05-26 | End: 2019-05-29

## 2019-05-26 RX ORDER — PANTOPRAZOLE SODIUM 20 MG/1
40 TABLET, DELAYED RELEASE ORAL DAILY
Refills: 0 | Status: DISCONTINUED | OUTPATIENT
Start: 2019-05-26 | End: 2019-05-26

## 2019-05-26 RX ORDER — PANTOPRAZOLE SODIUM 20 MG/1
40 TABLET, DELAYED RELEASE ORAL EVERY 12 HOURS
Refills: 0 | Status: DISCONTINUED | OUTPATIENT
Start: 2019-05-27 | End: 2019-05-28

## 2019-05-26 RX ORDER — LEVOTHYROXINE SODIUM 125 MCG
112 TABLET ORAL DAILY
Refills: 0 | Status: DISCONTINUED | OUTPATIENT
Start: 2019-05-26 | End: 2019-05-29

## 2019-05-26 RX ORDER — LIDOCAINE 4 G/100G
10 CREAM TOPICAL ONCE
Refills: 0 | Status: COMPLETED | OUTPATIENT
Start: 2019-05-26 | End: 2019-05-26

## 2019-05-26 RX ORDER — MORPHINE SULFATE 50 MG/1
4 CAPSULE, EXTENDED RELEASE ORAL ONCE
Refills: 0 | Status: DISCONTINUED | OUTPATIENT
Start: 2019-05-26 | End: 2019-05-26

## 2019-05-26 RX ORDER — DEXTROSE MONOHYDRATE, SODIUM CHLORIDE, AND POTASSIUM CHLORIDE 50; .745; 4.5 G/1000ML; G/1000ML; G/1000ML
1000 INJECTION, SOLUTION INTRAVENOUS
Refills: 0 | Status: DISCONTINUED | OUTPATIENT
Start: 2019-05-26 | End: 2019-05-26

## 2019-05-26 RX ORDER — SODIUM CHLORIDE 9 MG/ML
1000 INJECTION INTRAMUSCULAR; INTRAVENOUS; SUBCUTANEOUS ONCE
Refills: 0 | Status: COMPLETED | OUTPATIENT
Start: 2019-05-26 | End: 2019-05-26

## 2019-05-26 RX ORDER — ONDANSETRON 8 MG/1
4 TABLET, FILM COATED ORAL ONCE
Refills: 0 | Status: COMPLETED | OUTPATIENT
Start: 2019-05-26 | End: 2019-05-26

## 2019-05-26 RX ORDER — FAMOTIDINE 10 MG/ML
20 INJECTION INTRAVENOUS DAILY
Refills: 0 | Status: DISCONTINUED | OUTPATIENT
Start: 2019-05-26 | End: 2019-05-26

## 2019-05-26 RX ORDER — DEXTROSE MONOHYDRATE, SODIUM CHLORIDE, AND POTASSIUM CHLORIDE 50; .745; 4.5 G/1000ML; G/1000ML; G/1000ML
1000 INJECTION, SOLUTION INTRAVENOUS
Refills: 0 | Status: DISCONTINUED | OUTPATIENT
Start: 2019-05-26 | End: 2019-05-27

## 2019-05-26 RX ORDER — SIMVASTATIN 20 MG/1
20 TABLET, FILM COATED ORAL AT BEDTIME
Refills: 0 | Status: DISCONTINUED | OUTPATIENT
Start: 2019-05-26 | End: 2019-05-29

## 2019-05-26 RX ORDER — HEPARIN SODIUM 5000 [USP'U]/ML
5000 INJECTION INTRAVENOUS; SUBCUTANEOUS EVERY 8 HOURS
Refills: 0 | Status: DISCONTINUED | OUTPATIENT
Start: 2019-05-26 | End: 2019-05-29

## 2019-05-26 RX ORDER — ATENOLOL 25 MG/1
50 TABLET ORAL DAILY
Refills: 0 | Status: DISCONTINUED | OUTPATIENT
Start: 2019-05-26 | End: 2019-05-29

## 2019-05-26 RX ORDER — SODIUM CHLORIDE 9 MG/ML
1000 INJECTION INTRAMUSCULAR; INTRAVENOUS; SUBCUTANEOUS
Refills: 0 | Status: DISCONTINUED | OUTPATIENT
Start: 2019-05-26 | End: 2019-05-26

## 2019-05-26 RX ORDER — FAMOTIDINE 10 MG/ML
20 INJECTION INTRAVENOUS ONCE
Refills: 0 | Status: COMPLETED | OUTPATIENT
Start: 2019-05-26 | End: 2019-05-26

## 2019-05-26 RX ADMIN — LIDOCAINE 10 MILLILITER(S): 4 CREAM TOPICAL at 16:14

## 2019-05-26 RX ADMIN — FAMOTIDINE 20 MILLIGRAM(S): 10 INJECTION INTRAVENOUS at 16:14

## 2019-05-26 RX ADMIN — Medication 30 MILLILITER(S): at 16:14

## 2019-05-26 RX ADMIN — SODIUM CHLORIDE 1000 MILLILITER(S): 9 INJECTION INTRAMUSCULAR; INTRAVENOUS; SUBCUTANEOUS at 16:14

## 2019-05-26 RX ADMIN — SODIUM CHLORIDE 150 MILLILITER(S): 9 INJECTION INTRAMUSCULAR; INTRAVENOUS; SUBCUTANEOUS at 19:00

## 2019-05-26 RX ADMIN — DEXTROSE MONOHYDRATE, SODIUM CHLORIDE, AND POTASSIUM CHLORIDE 150 MILLILITER(S): 50; .745; 4.5 INJECTION, SOLUTION INTRAVENOUS at 22:38

## 2019-05-26 RX ADMIN — Medication 81 MILLIGRAM(S): at 20:01

## 2019-05-26 RX ADMIN — PANTOPRAZOLE SODIUM 40 MILLIGRAM(S): 20 TABLET, DELAYED RELEASE ORAL at 20:01

## 2019-05-26 RX ADMIN — SIMVASTATIN 20 MILLIGRAM(S): 20 TABLET, FILM COATED ORAL at 22:52

## 2019-05-26 RX ADMIN — ONDANSETRON 4 MILLIGRAM(S): 8 TABLET, FILM COATED ORAL at 16:14

## 2019-05-26 RX ADMIN — HEPARIN SODIUM 5000 UNIT(S): 5000 INJECTION INTRAVENOUS; SUBCUTANEOUS at 22:51

## 2019-05-26 NOTE — ED PROVIDER NOTE - OBJECTIVE STATEMENT
64 F with hx of cholecystectomy presents with epigastric pain radiating to chest and L shoulder starting last night.  Patient states that symptoms started after going to a BBQ.  No fever/chills.  No shortness of breath, no other complaints.

## 2019-05-26 NOTE — H&P ADULT - PROBLEM SELECTOR PLAN 7
IMPROVE VTE Individual Risk Assessment          RISK                                                          Points  [  ] Previous VTE                                                3  [  ] Thrombophilia                                             2  [  ] Lower limb paralysis                                   2        (unable to hold up >15 seconds)    [  ] Current Cancer                                             2         (within 6 months)  [ x ] Immobilization > 24 hrs                              1  [  ] ICU/CCU stay > 24 hours                             1  [ x ] Age > 60                                                         1    IMPROVE VTE Score: 2    c/w lovenox for vte prophylaxis

## 2019-05-26 NOTE — H&P ADULT - NSICDXPASTMEDICALHX_GEN_ALL_CORE_FT
PAST MEDICAL HISTORY:  Diverticulitis     HLD (hyperlipidemia)     HTN (hypertension)     Hyperthyroidism s/p radiation    Renal stone in 2013 PAST MEDICAL HISTORY:  Diverticulitis     H/O Clostridium difficile infection treated 1 year ago    HLD (hyperlipidemia)     HTN (hypertension)     Hyperthyroidism s/p radiation    Renal stone in 2013

## 2019-05-26 NOTE — H&P ADULT - NSICDXFAMILYHX_GEN_ALL_CORE_FT
FAMILY HISTORY:  Family history of CABG, father  FH: breast cancer, mother  FH: HTN (hypertension), mother

## 2019-05-26 NOTE — H&P ADULT - NSHPLABSRESULTS_GEN_ALL_CORE
LABS:      CBC Full  -  ( 26 May 2019 16:22 )  WBC Count : 10.65 K/uL  RBC Count : 4.54 M/uL  Hemoglobin : 13.5 g/dL  Hematocrit : 39.9 %  Platelet Count - Automated : 238 K/uL  Mean Cell Volume : 87.9 fl  Mean Cell Hemoglobin : 29.7 pg  Mean Cell Hemoglobin Concentration : 33.8 gm/dL  Auto Neutrophil # : 6.25 K/uL  Auto Lymphocyte # : 2.70 K/uL  Auto Monocyte # : 0.62 K/uL  Auto Eosinophil # : 1.01 K/uL  Auto Basophil # : 0.04 K/uL  Auto Neutrophil % : 58.6 %  Auto Lymphocyte % : 25.4 %  Auto Monocyte % : 5.8 %  Auto Eosinophil % : 9.5 %  Auto Basophil % : 0.4 %    05-26    142  |  107  |  20<H>  ----------------------------<  110<H>  3.4<L>   |  27  |  0.96    Ca    8.8      26 May 2019 16:22    TPro  7.6  /  Alb  3.5  /  TBili  0.6  /  DBili  x   /  AST  56<H>  /  ALT  39  /  AlkPhos  65  05-26    Lipase - 1707    RADIOLOGY & ADDITIONAL STUDIES (The following images were personally reviewed):      EXAM:  CT ABDOMEN AND PELVIS IC                        PROCEDURE DATE:  05/26/2019    FINDINGS:  LOWER CHEST: Within normal limits  ABDOMEN:  LIVER: Within normal limits  BILE DUCTS: Normal caliber  GALLBLADDER: Cholecystectomy  PANCREAS: Within normal limits. Mild fat stranding and mesenteric lymph   nodes at the root of the mesentery adjacent to the pancreatic body,   similar to prior.  KIDNEYS: Bilateral renal hypodensities, some indeterminate, some   especially right posterior interpolar mildly enlarged from prior   measuring 1.1 cm. It be followed with nonemergent contrast-enhanced MRI.  No CT evidence of pancreatitis. Stable from 2017 mesenteric   adenitis/panniculitis.

## 2019-05-26 NOTE — H&P ADULT - PROBLEM SELECTOR PLAN 1
epigastric pain with radiation to back, flank, and chest with lipase of 1707  CT abdomen/pelvis with IV contrast negative for evidence of pancreatitis, however some fat stranding with mesenteric lymphadenopathy noted  will treat as clinical pancreatitis given presentation  normal triglyceride level, s/p cholecystectomy, does not regularly drink alcohol, will obtain IgG4 to exclude autoimmune pancreatitis as pt unsure diagnosis of hyperthyroidism in the past (may have been Graves'), unlikely drugs/medications as pt not recently started on anything new  c/w IV fluids, pain control, clear liquid diet with advancement as tolerated epigastric pain with radiation to back, flank, and chest with lipase of 1707  CT abdomen/pelvis with IV contrast negative for evidence of pancreatitis, however some fat stranding with mesenteric lymphadenopathy noted, will likely need further investigation with MRI  f/u GI consult - Dr. Ventura  will treat as clinical pancreatitis given presentation, keep NPO for now  normal triglyceride level, s/p cholecystectomy, does not regularly drink alcohol, will obtain IgG4 to exclude autoimmune pancreatitis as pt unsure diagnosis of hyperthyroidism in the past (may have been Graves'), unlikely drugs/medications as pt not recently started on anything new  c/w IV fluids, pain control, clear liquid diet with advancement as tolerated epigastric pain with radiation to back, flank, and chest with lipase of 1707  CT abdomen/pelvis with IV contrast negative for evidence of pancreatitis, however some fat stranding with mesenteric lymphadenopathy noted, will likely need further investigation with MRI; possibly pt may also have component of PUD, will c/w IV protonix for now  f/u GI consult - Dr. Ventura  will treat as clinical pancreatitis given presentation, keep NPO for now  normal triglyceride level, s/p cholecystectomy, does not regularly drink alcohol, will obtain IgG4 to exclude autoimmune pancreatitis as pt unsure diagnosis of hyperthyroidism in the past (may have been Graves'), unlikely drugs/medications as pt not recently started on anything new  c/w IV fluids, pain control, clear liquid diet with advancement as tolerated

## 2019-05-26 NOTE — H&P ADULT - NSHPPHYSICALEXAM_GEN_ALL_CORE
Vital Signs Last 24 Hrs  T(C): 36.2 (26 May 2019 15:22), Max: 36.2 (26 May 2019 15:22)  T(F): 97.1 (26 May 2019 15:22), Max: 97.1 (26 May 2019 15:22)  HR: 62 (26 May 2019 15:22) (62 - 62)  BP: 118/72 (26 May 2019 15:22) (118/72 - 118/72)  RR: 16 (26 May 2019 15:22) (16 - 16)  SpO2: 100% (26 May 2019 15:22) (100% - 100%)  ________________________________________________  PHYSICAL EXAM:  GENERAL: NAD  HEENT: Normocephalic;  conjunctivae and sclerae clear; moist mucous membranes  NECK : supple, no JVD  CHEST/LUNG: Clear to auscultation bilaterally with good air entry   HEART: S1 S2  regular; no murmurs, gallops or rubs  ABDOMEN: Soft, epigastric tenderness to palpation as well as LLQ tenderness, Nondistended; Bowel sounds present  EXTREMITIES: no cyanosis; trace pitting edema; no calf tenderness  SKIN: multiple varicose veins diffusely on bilateral LE  NERVOUS SYSTEM:  Awake and alert; Oriented  to place, person and time

## 2019-05-26 NOTE — ED ADULT TRIAGE NOTE - CHIEF COMPLAINT QUOTE
C/o chest pain with numbness to top of mouth and fingers b/l x 1 hr and upper abdominal pain, vomiting last night

## 2019-05-26 NOTE — H&P ADULT - ATTENDING COMMENTS
Patient seen and examined; Agree with PGY2 MAR A/P above with editing as needed. Discussed with MONICA Clarke.    64 F with PMH of cholecystectomy, hypothyroidism, HTN, HLD, diverticulitis, episode of C. diff infection in the past, presents to ED due to epigastric pain which she describes as a squeezing sensation radiating towards the back and flank starting today at approx 2PM.  Pt states that last night she had nausea with NBNB vomiting and nonbloody watery diarrhea 1 AM earlier today after having eaten at BB including steak, corn, and corn dogs. Pt had toast with butter at approx 2pm which then initiated the epigastric pain with radiation to back and chest. No similar complaints in the past. had Colonoscopy 2017 Dr. Claire was negative only Diverticulosis. EGD many yrs ago when she had Gallbladder problem.     Patient is feeling much better after having received Maalox and Pepcid in ED    ROS: As above; No weight loss  SH: Non smoker; last alcohol 1 month ago while on vacation in [place], lives with spouse  FH: No Pancreatitis or Colon Ca;     Vital Signs Last 24 Hrs  T(C): 36.2 (26 May 2019 15:22), Max: 36.2 (26 May 2019 15:22)  T(F): 97.1 (26 May 2019 15:22), Max: 97.1 (26 May 2019 15:22)  HR: 62 (26 May 2019 15:22) (62 - 62)  BP: 118/72 (26 May 2019 15:22) (118/72 - 118/72)  RR: 16 (26 May 2019 15:22) (16 - 16)  SpO2: 100% (26 May 2019 15:22) (100% - 100%)    P/E:  Neuro: AAO x3; No focal deficits  CVS: S1S2 present, regular  Resp: BLAE+, No wheeze or Rhonchi  GI: Soft, BS+, mild tenderness epigastric area; No rebound; Non distended  Extr: No edema or calf tenderness B/L LE    Labs:                        13.5   10.65 )-----------( 238      ( 26 May 2019 16:22 )             39.9   05-26    142  |  107  |  20<H>  ----------------------------<  110<H>  3.4<L>   |  27  |  0.96    Ca    8.8      26 May 2019 16:22    TPro  7.6  /  Alb  3.5  /  TBili  0.6  /  DBili  x   /  AST  56<H>  /  ALT  39  /  AlkPhos  65  05-26    Lipid Profile (05.26.19 @ 17:42)    Total Cholesterol/HDL Ratio Measurement: 3.0 RATIO    Cholesterol, Serum: 182 mg/dL    Triglycerides, Serum: 81 mg/dL    HDL Cholesterol, Serum: 60:     Direct LDL: 106    CT Abdomen and Pelvis w/ IV Cont (05.26.19 @ 18:15)     FINDINGS:  LOWER CHEST: Within normal limits  ABDOMEN:  LIVER: Within normal limits  BILE DUCTS: Normal caliber  GALLBLADDER: Cholecystectomy  PANCREAS: Within normal limits. Mild fat stranding and mesenteric lymph nodes at the root of the mesentery adjacent to the pancreatic body, similar to prior.  SPLEEN: Within normal limits  ADRENALS: Within normal limits  KIDNEYS: Bilateral renal hypo densities, some indeterminate, some especially right posterior interpolar mildly enlarged from prior measuring 1.1 cm. It be followed with nonemergent contrast-enhanced MRI.  PELVIS:  REPRODUCTIVE ORGANS: No pelvic masses  BLADDER: Within normal limits  PERITONEUM: No ascites, no free air.  BOWEL: Diverticulosis. Normal appendix.  VESSELS: Within normal limits  RETROPERITONEUM: No retroperitoneal or pelvic adenopathy  ABDOMINAL WALL: Within normal limits  MUSCULOSKELETAL: Within normal limits    IMPRESSION:   No CT evidence of pancreatitis. Stable from 2017 mesenteric adenitis/panniculitis. Incidental renal findings as above.    D/D: Severe epigastric pain and elevated Lipase likely related to Peptic Ulcer disease less likely Pancreatitis  Epigastric pain radiating to shoulders less likely ACS  Renal Hypodense lesions     Plan:  Tele x 24 hrs; Serial cardiac enzymes x3; Echo;  If negative enzymes D/C Tele  IV fluids; NPO except sips of water for tonight; Advance to clears in AM and will advance further in next 12 to 24 hrs.   IV Protonix BID for 24 hrs and switch to oral once tolerating diet; Can get IgG4 to complete work up  GI consult in AM if persistent symptoms as patient may need an EGD; can consult Dr Ventura if available  Discussed with patient and spouse at length findings, likely etiology and plan of care  Discussed with MONICA Clarke

## 2019-05-26 NOTE — H&P ADULT - HISTORY OF PRESENT ILLNESS
64 F with PMH of cholecystectomy, presents to ED due to epigastric pain which she describes as a squeezing sensation radiating towards the back and flank starting today at approx 2PM.  Pt states that last night she had nausea with NBNB vomiting and nonbloody watery diarrhea 1 AM earlier today after having eaten at [employer] including steak, corn, and corndogs. Pt had toast with butter at approx 2pm which then initiated the epigastric pain with radiation to back and chest. Pt otherwise denies fever, chills, sweating, shortness of breath, all other ROS negative. Pt does admit to feeling shaky with temporary numbness of bilateral fingers and lips. 64 F with PMH of cholecystectomy, hypothyroidism, HTN, HLD, diverticulitis, episode of C. diff infection in the past, presents to ED due to epigastric pain which she describes as a squeezing sensation radiating towards the back and flank starting today at approx 2PM.  Pt states that last night she had nausea with NBNB vomiting and nonbloody watery diarrhea 1 AM earlier today after having eaten at [employer] including steak, corn, and corndogs. Pt had toast with butter at approx 2pm which then initiated the epigastric pain with radiation to back and chest. Pt otherwise denies fever, chills, sweating, shortness of breath, all other ROS negative. Pt does admit to feeling shaky with temporary numbness of bilateral fingers and lips.

## 2019-05-26 NOTE — H&P ADULT - PROBLEM SELECTOR PLAN 6
bilateral renal hypodensities, right posterior interpolar mildly enlarged from prior   measuring 1.1 cm  pt may need MRI for further investigation

## 2019-05-26 NOTE — H&P ADULT - PROBLEM SELECTOR PLAN 2
radiation of epigastric to left chest and shoulder  atypical chest pain, however given risk factors will evaluate for ACS r/o MI  c/w telemetry monitoring for now  f/u echocardiogram  T1 negative, f/u T2, T3  f/u cardiology consult radiation of epigastric to left chest and shoulder  atypical chest pain, however given risk factors will evaluate for ACS r/o MI  c/w telemetry monitoring for now  T1 negative, f/u T2, T3  f/u echo in AM radiation of epigastric to left chest and shoulder  atypical chest pain, however given risk factors will evaluate for ACS r/o MI  c/w telemetry monitoring for now  T1 negative, f/u T2, T3  f/u echo in AM  if troponins negative and echo normal, can d/c tele

## 2019-05-26 NOTE — ED ADULT NURSE NOTE - OBJECTIVE STATEMENT
pt is here for chest pain.  pt stated that chest pain starting one hour ago, back pain, denied headache or sob, denied blurred vision, pt calm at this time.

## 2019-05-27 LAB
ALBUMIN SERPL ELPH-MCNC: 3.1 G/DL — LOW (ref 3.5–5)
ALP SERPL-CCNC: 53 U/L — SIGNIFICANT CHANGE UP (ref 40–120)
ALT FLD-CCNC: 32 U/L DA — SIGNIFICANT CHANGE UP (ref 10–60)
ANION GAP SERPL CALC-SCNC: 7 MMOL/L — SIGNIFICANT CHANGE UP (ref 5–17)
AST SERPL-CCNC: 29 U/L — SIGNIFICANT CHANGE UP (ref 10–40)
BILIRUB SERPL-MCNC: 0.7 MG/DL — SIGNIFICANT CHANGE UP (ref 0.2–1.2)
BUN SERPL-MCNC: 14 MG/DL — SIGNIFICANT CHANGE UP (ref 7–18)
CALCIUM SERPL-MCNC: 8.3 MG/DL — LOW (ref 8.4–10.5)
CHLORIDE SERPL-SCNC: 111 MMOL/L — HIGH (ref 96–108)
CO2 SERPL-SCNC: 26 MMOL/L — SIGNIFICANT CHANGE UP (ref 22–31)
CREAT SERPL-MCNC: 0.84 MG/DL — SIGNIFICANT CHANGE UP (ref 0.5–1.3)
GLUCOSE SERPL-MCNC: 75 MG/DL — SIGNIFICANT CHANGE UP (ref 70–99)
HCT VFR BLD CALC: 36.5 % — SIGNIFICANT CHANGE UP (ref 34.5–45)
HCV AB S/CO SERPL IA: 0.12 S/CO — SIGNIFICANT CHANGE UP (ref 0–0.99)
HCV AB SERPL-IMP: SIGNIFICANT CHANGE UP
HGB BLD-MCNC: 12 G/DL — SIGNIFICANT CHANGE UP (ref 11.5–15.5)
MAGNESIUM SERPL-MCNC: 1.9 MG/DL — SIGNIFICANT CHANGE UP (ref 1.6–2.6)
MCHC RBC-ENTMCNC: 29.6 PG — SIGNIFICANT CHANGE UP (ref 27–34)
MCHC RBC-ENTMCNC: 32.9 GM/DL — SIGNIFICANT CHANGE UP (ref 32–36)
MCV RBC AUTO: 90.1 FL — SIGNIFICANT CHANGE UP (ref 80–100)
NRBC # BLD: 0 /100 WBCS — SIGNIFICANT CHANGE UP (ref 0–0)
PHOSPHATE SERPL-MCNC: 3.3 MG/DL — SIGNIFICANT CHANGE UP (ref 2.5–4.5)
PLATELET # BLD AUTO: 215 K/UL — SIGNIFICANT CHANGE UP (ref 150–400)
POTASSIUM SERPL-MCNC: 3.7 MMOL/L — SIGNIFICANT CHANGE UP (ref 3.5–5.3)
POTASSIUM SERPL-SCNC: 3.7 MMOL/L — SIGNIFICANT CHANGE UP (ref 3.5–5.3)
PROT SERPL-MCNC: 6.5 G/DL — SIGNIFICANT CHANGE UP (ref 6–8.3)
RBC # BLD: 4.05 M/UL — SIGNIFICANT CHANGE UP (ref 3.8–5.2)
RBC # FLD: 12.7 % — SIGNIFICANT CHANGE UP (ref 10.3–14.5)
SODIUM SERPL-SCNC: 144 MMOL/L — SIGNIFICANT CHANGE UP (ref 135–145)
TROPONIN I SERPL-MCNC: <0.015 NG/ML — SIGNIFICANT CHANGE UP (ref 0–0.04)
WBC # BLD: 8.86 K/UL — SIGNIFICANT CHANGE UP (ref 3.8–10.5)
WBC # FLD AUTO: 8.86 K/UL — SIGNIFICANT CHANGE UP (ref 3.8–10.5)

## 2019-05-27 PROCEDURE — 99233 SBSQ HOSP IP/OBS HIGH 50: CPT | Mod: GC

## 2019-05-27 RX ORDER — ASPIRIN/CALCIUM CARB/MAGNESIUM 324 MG
325 TABLET ORAL ONCE
Refills: 0 | Status: COMPLETED | OUTPATIENT
Start: 2019-05-27 | End: 2019-05-27

## 2019-05-27 RX ORDER — DEXTROSE MONOHYDRATE, SODIUM CHLORIDE, AND POTASSIUM CHLORIDE 50; .745; 4.5 G/1000ML; G/1000ML; G/1000ML
1000 INJECTION, SOLUTION INTRAVENOUS
Refills: 0 | Status: DISCONTINUED | OUTPATIENT
Start: 2019-05-27 | End: 2019-05-29

## 2019-05-27 RX ORDER — ONDANSETRON 8 MG/1
4 TABLET, FILM COATED ORAL ONCE
Refills: 0 | Status: COMPLETED | OUTPATIENT
Start: 2019-05-27 | End: 2019-05-27

## 2019-05-27 RX ADMIN — SIMVASTATIN 20 MILLIGRAM(S): 20 TABLET, FILM COATED ORAL at 21:06

## 2019-05-27 RX ADMIN — Medication 325 MILLIGRAM(S): at 09:49

## 2019-05-27 RX ADMIN — HEPARIN SODIUM 5000 UNIT(S): 5000 INJECTION INTRAVENOUS; SUBCUTANEOUS at 14:06

## 2019-05-27 RX ADMIN — DEXTROSE MONOHYDRATE, SODIUM CHLORIDE, AND POTASSIUM CHLORIDE 100 MILLILITER(S): 50; .745; 4.5 INJECTION, SOLUTION INTRAVENOUS at 12:38

## 2019-05-27 RX ADMIN — ONDANSETRON 4 MILLIGRAM(S): 8 TABLET, FILM COATED ORAL at 12:38

## 2019-05-27 RX ADMIN — HEPARIN SODIUM 5000 UNIT(S): 5000 INJECTION INTRAVENOUS; SUBCUTANEOUS at 05:54

## 2019-05-27 RX ADMIN — PANTOPRAZOLE SODIUM 40 MILLIGRAM(S): 20 TABLET, DELAYED RELEASE ORAL at 21:06

## 2019-05-27 RX ADMIN — Medication 112 MICROGRAM(S): at 05:54

## 2019-05-27 RX ADMIN — PANTOPRAZOLE SODIUM 40 MILLIGRAM(S): 20 TABLET, DELAYED RELEASE ORAL at 08:41

## 2019-05-27 RX ADMIN — DEXTROSE MONOHYDRATE, SODIUM CHLORIDE, AND POTASSIUM CHLORIDE 150 MILLILITER(S): 50; .745; 4.5 INJECTION, SOLUTION INTRAVENOUS at 05:52

## 2019-05-27 RX ADMIN — HEPARIN SODIUM 5000 UNIT(S): 5000 INJECTION INTRAVENOUS; SUBCUTANEOUS at 21:06

## 2019-05-27 NOTE — PROGRESS NOTE ADULT - SUBJECTIVE AND OBJECTIVE BOX
Patient is a 64y old  Female who presents with a chief complaint of epigastric / chest pain (26 May 2019 18:27)    INTERVAL HPI/OVERNIGHT EVENTS:  Patient seen and examined at bedside, feels ok  Her abdominal pain is improving    Allergies    No Known Allergies    Intolerances        REVIEW OF SYSTEMS:  CONSTITUTIONAL: No fever, weight loss, or fatigue  EYES: No eye pain, visual disturbances, or discharge  RESPIRATORY: No cough, wheezing or shortness of breath  CARDIOVASCULAR: No chest pain, feeling of heart beats  GASTROINTESTINAL: abdominal pain improving.  GENITOURINARY: No dysuria, frequency, hematuria  NEUROLOGICAL: No headaches  MUSCULOSKELETAL: No joint pain  PSYCHIATRIC: No depression or anxiety  HEME/LYMPH: No easy bruising, or bleeding gums  ALLERGY AND IMMUNOLOGIC: No hives or eczema    Medications:  aspirin enteric coated 81 milliGRAM(s) Oral daily  ATENolol  Tablet 50 milliGRAM(s) Oral daily  heparin  Injectable 5000 Unit(s) SubCutaneous every 8 hours  levothyroxine 112 MICROGram(s) Oral daily  pantoprazole  Injectable 40 milliGRAM(s) IV Push every 12 hours  simvastatin 20 milliGRAM(s) Oral at bedtime  sodium chloride 0.9% with potassium chloride 20 mEq/L 1000 milliLiter(s) IV Continuous <Continuous>      PHYSICAL EXAM:  Vital Signs Last 24 Hrs  T(C): 36.6 (27 May 2019 14:50), Max: 36.8 (26 May 2019 20:22)  T(F): 97.8 (27 May 2019 14:50), Max: 98.3 (26 May 2019 20:22)  HR: 61 (27 May 2019 14:50) (55 - 63)  BP: 134/68 (27 May 2019 14:50) (122/56 - 137/64)  BP(mean): --  RR: 16 (27 May 2019 14:50) (16 - 18)  SpO2: 98% (27 May 2019 14:50) (96% - 98%)    GENERAL: NAD  HEAD:  Atraumatic, Normocephalic  EYES: EOMI, PERRLA, conjunctiva and sclera clear  ENT: moist mucous membranes  NECK: Supple, No JVD, Normal thyroid  NERVOUS SYSTEM:  Alert & Oriented X3, Good concentration; Motor Strength 5/5 B/L upper and lower extremities; DTRs 2+ intact and symmetric  CHEST/LUNG: No rales, rhonchi, wheezing, or rubs  HEART: Regular rate and rhythm; No murmurs, rubs, or gallops  ABDOMEN: Tender at the epigastrium   EXTREMITIES:  2+ Peripheral Pulses, No clubbing, cyanosis, or edema  SKIN: No rashes or lesions    LABS:                        12.0   8.86  )-----------( 215      ( 27 May 2019 07:23 )             36.5     05-27    144  |  111<H>  |  14  ----------------------------<  75  3.7   |  26  |  0.84    Ca    8.3<L>      27 May 2019 07:23  Phos  3.3     05-27  Mg     1.9     05-27    TPro  6.5  /  Alb  3.1<L>  /  TBili  0.7  /  DBili  x   /  AST  29  /  ALT  32  /  AlkPhos  53  05-27    LIVER FUNCTIONS - ( 27 May 2019 07:23 )  Alb: 3.1 g/dL / Pro: 6.5 g/dL / ALK PHOS: 53 U/L / ALT: 32 U/L DA / AST: 29 U/L / GGT: x               CARDIAC MARKERS ( 27 May 2019 07:23 )  <0.015 ng/mL / x     / x     / x     / x      CARDIAC MARKERS ( 26 May 2019 22:40 )  <0.015 ng/mL / x     / x     / x     / x      CARDIAC MARKERS ( 26 May 2019 16:22 )  <0.015 ng/mL / x     / x     / x     / x            Culture & Sensitivity:   CAPILLARY BLOOD GLUCOSE            RADIOLOGY & ADDITIONAL TESTS:  Radiology testing independently reviewed:     Consultant(s) Notes Reviewed:  [ x] YES  [ ] NO    Care Discussed with Consultants/Other Providers [ x] YES  [ ] NO

## 2019-05-27 NOTE — PROGRESS NOTE ADULT - PROBLEM SELECTOR PLAN 1
No obvious cause of pancreatitis identified: patient occasionally drinks alcohol, last consumption was 1 month ago, US negative for gall stones, no Report of any medication to cause pancreatitis, and no Lipid panel abnormality.   Suspecting autoimmune problem. F.u IGg 4  f.u GI recommendation

## 2019-05-28 ENCOUNTER — TRANSCRIPTION ENCOUNTER (OUTPATIENT)
Age: 64
End: 2019-05-28

## 2019-05-28 LAB
ANION GAP SERPL CALC-SCNC: 6 MMOL/L — SIGNIFICANT CHANGE UP (ref 5–17)
BUN SERPL-MCNC: 7 MG/DL — SIGNIFICANT CHANGE UP (ref 7–18)
CALCIUM SERPL-MCNC: 8.4 MG/DL — SIGNIFICANT CHANGE UP (ref 8.4–10.5)
CHLORIDE SERPL-SCNC: 112 MMOL/L — HIGH (ref 96–108)
CO2 SERPL-SCNC: 25 MMOL/L — SIGNIFICANT CHANGE UP (ref 22–31)
CREAT SERPL-MCNC: 0.87 MG/DL — SIGNIFICANT CHANGE UP (ref 0.5–1.3)
GLUCOSE SERPL-MCNC: 80 MG/DL — SIGNIFICANT CHANGE UP (ref 70–99)
HCT VFR BLD CALC: 37.2 % — SIGNIFICANT CHANGE UP (ref 34.5–45)
HGB BLD-MCNC: 12.4 G/DL — SIGNIFICANT CHANGE UP (ref 11.5–15.5)
MAGNESIUM SERPL-MCNC: 2.1 MG/DL — SIGNIFICANT CHANGE UP (ref 1.6–2.6)
MCHC RBC-ENTMCNC: 29.7 PG — SIGNIFICANT CHANGE UP (ref 27–34)
MCHC RBC-ENTMCNC: 33.3 GM/DL — SIGNIFICANT CHANGE UP (ref 32–36)
MCV RBC AUTO: 89 FL — SIGNIFICANT CHANGE UP (ref 80–100)
NRBC # BLD: 0 /100 WBCS — SIGNIFICANT CHANGE UP (ref 0–0)
PHOSPHATE SERPL-MCNC: 3.1 MG/DL — SIGNIFICANT CHANGE UP (ref 2.5–4.5)
PLATELET # BLD AUTO: 208 K/UL — SIGNIFICANT CHANGE UP (ref 150–400)
POTASSIUM SERPL-MCNC: 4.1 MMOL/L — SIGNIFICANT CHANGE UP (ref 3.5–5.3)
POTASSIUM SERPL-SCNC: 4.1 MMOL/L — SIGNIFICANT CHANGE UP (ref 3.5–5.3)
RBC # BLD: 4.18 M/UL — SIGNIFICANT CHANGE UP (ref 3.8–5.2)
RBC # FLD: 12.5 % — SIGNIFICANT CHANGE UP (ref 10.3–14.5)
SODIUM SERPL-SCNC: 143 MMOL/L — SIGNIFICANT CHANGE UP (ref 135–145)
TSH SERPL-MCNC: 2.18 UU/ML — SIGNIFICANT CHANGE UP (ref 0.34–4.82)
WBC # BLD: 7.84 K/UL — SIGNIFICANT CHANGE UP (ref 3.8–10.5)
WBC # FLD AUTO: 7.84 K/UL — SIGNIFICANT CHANGE UP (ref 3.8–10.5)

## 2019-05-28 PROCEDURE — 99223 1ST HOSP IP/OBS HIGH 75: CPT

## 2019-05-28 PROCEDURE — 99233 SBSQ HOSP IP/OBS HIGH 50: CPT | Mod: GC

## 2019-05-28 RX ORDER — PANTOPRAZOLE SODIUM 20 MG/1
40 TABLET, DELAYED RELEASE ORAL
Refills: 0 | Status: DISCONTINUED | OUTPATIENT
Start: 2019-05-28 | End: 2019-05-29

## 2019-05-28 RX ORDER — LOPERAMIDE HCL 2 MG
2 TABLET ORAL EVERY 4 HOURS
Refills: 0 | Status: DISCONTINUED | OUTPATIENT
Start: 2019-05-28 | End: 2019-05-29

## 2019-05-28 RX ADMIN — Medication 2 MILLIGRAM(S): at 22:18

## 2019-05-28 RX ADMIN — Medication 112 MICROGRAM(S): at 05:30

## 2019-05-28 RX ADMIN — DEXTROSE MONOHYDRATE, SODIUM CHLORIDE, AND POTASSIUM CHLORIDE 100 MILLILITER(S): 50; .745; 4.5 INJECTION, SOLUTION INTRAVENOUS at 09:44

## 2019-05-28 RX ADMIN — ATENOLOL 50 MILLIGRAM(S): 25 TABLET ORAL at 05:30

## 2019-05-28 RX ADMIN — HEPARIN SODIUM 5000 UNIT(S): 5000 INJECTION INTRAVENOUS; SUBCUTANEOUS at 05:30

## 2019-05-28 RX ADMIN — SIMVASTATIN 20 MILLIGRAM(S): 20 TABLET, FILM COATED ORAL at 22:18

## 2019-05-28 RX ADMIN — PANTOPRAZOLE SODIUM 40 MILLIGRAM(S): 20 TABLET, DELAYED RELEASE ORAL at 18:08

## 2019-05-28 RX ADMIN — HEPARIN SODIUM 5000 UNIT(S): 5000 INJECTION INTRAVENOUS; SUBCUTANEOUS at 22:18

## 2019-05-28 RX ADMIN — HEPARIN SODIUM 5000 UNIT(S): 5000 INJECTION INTRAVENOUS; SUBCUTANEOUS at 15:15

## 2019-05-28 RX ADMIN — PANTOPRAZOLE SODIUM 40 MILLIGRAM(S): 20 TABLET, DELAYED RELEASE ORAL at 09:45

## 2019-05-28 NOTE — DISCHARGE NOTE PROVIDER - NSDCCPCAREPLAN_GEN_ALL_CORE_FT
PRINCIPAL DISCHARGE DIAGNOSIS  Diagnosis: Acute pancreatitis, unspecified complication status, unspecified pancreatitis type  Assessment and Plan of Treatment: PRINCIPAL DISCHARGE DIAGNOSIS  Diagnosis: Gastritis  Assessment and Plan of Treatment: S/p EGD showing mild gastritis and hiatus hernia  c/w PPI        SECONDARY DISCHARGE DIAGNOSES  Diagnosis: Chest pain  Assessment and Plan of Treatment: negative troponin  s/p TTE showing Trace mitral regurgitation. Normal left ventricular internal dimensions and wall thicknesses, normal left ventricular systolic function and normal right ventricular size and function.       Diagnosis: HTN (hypertension)  Assessment and Plan of Treatment: BP WNL  c/w atenolol.       Diagnosis: HLD (hyperlipidemia)  Assessment and Plan of Treatment: c/w statin.       Diagnosis: Hypothyroidism  Assessment and Plan of Treatment: c/w synthroid. PRINCIPAL DISCHARGE DIAGNOSIS  Diagnosis: Gastritis  Assessment and Plan of Treatment: S/p EGD showing mild gastritis and hiatus hernia  c/w PPI  follow up with GI, Dr. Gutiérrez in one week to f/u pathology report        SECONDARY DISCHARGE DIAGNOSES  Diagnosis: Chest pain  Assessment and Plan of Treatment: negative troponin  s/p TTE showing Trace mitral regurgitation. Normal left ventricular internal dimensions and wall thicknesses, normal left ventricular systolic function and normal right ventricular size and function.       Diagnosis: HTN (hypertension)  Assessment and Plan of Treatment: BP WNL  c/w atenolol.       Diagnosis: HLD (hyperlipidemia)  Assessment and Plan of Treatment: c/w statin.       Diagnosis: Hypothyroidism  Assessment and Plan of Treatment: c/w synthroid. PRINCIPAL DISCHARGE DIAGNOSIS  Diagnosis: Gastritis  Assessment and Plan of Treatment: You was admitted with severe epigastric pain with some radiation concerning for Pancreatitis. You was found to have mildly elevated Lipase levels to 1700. You was treated with intravenous Pepcid and Maalox in ED with significant improvement. There was no clear evidence of any Pancreatitis on CT scan. It seem slikely that you had an episode of severe gastritis possibly related to food.   You do not have risk factors for Pancreatitis as you do not drink alcohol, do not smoke and has no Gallbladder (Gallstones) and Triglycerides are normal which are all causes of Pancreatitis.   You was seen by Gastroenterologist Dr. Ventura, had Endoscopy ((EGD) showing mild gastritis and hiatus hernia.  You will continue to take PPI (Protonix) once daily 20 to 30 mins before Breakfast.   Follow up with GI, Dr. Gutiérrez in one week to f/u pathology report to evalaute for presence of a bacteria called Helcobacter Pylori which if positive will need treatment with a course of two antibiotics and PPI.         SECONDARY DISCHARGE DIAGNOSES  Diagnosis: Chest pain  Assessment and Plan of Treatment: You had some substernal pain likely from epigastric pain. Serial cardiac enzymes were negative for Acute Coronary syndrome (ACS).   We also did p Echocardiogram showing Trace mitral regurgitation. Normal left ventricular internal dimensions and wall thicknesses, normal left ventricular systolic function and normal right ventricular size and function.       Diagnosis: HTN (hypertension)  Assessment and Plan of Treatment: BP well controlled; Please continue  Atenolol.       Diagnosis: HLD (hyperlipidemia)  Assessment and Plan of Treatment: Continue statin.    Diagnosis: Hypothyroidism  Assessment and Plan of Treatment: c/w synthroid. PRINCIPAL DISCHARGE DIAGNOSIS  Diagnosis: Gastritis  Assessment and Plan of Treatment: You was admitted with severe epigastric pain with some radiation concerning for Pancreatitis. You was found to have mildly elevated Lipase levels to 1700. You was treated with intravenous Pepcid and Maalox in ED with significant improvement. There was no clear evidence of any Pancreatitis on CT scan. There was no evidence of CBD dilatation on Ultrasound. Liver enzymes were with in normal limits. It seem slikely that you had an episode of severe gastritis possibly related to food.   You do not have risk factors for Pancreatitis as you do not drink alcohol, do not smoke and has no Gallbladder (Gallstones) and Triglycerides are normal which are all causes of Pancreatitis.   You was seen by Gastroenterologist Dr. Ventura, had Endoscopy ((EGD) showing mild gastritis and hiatus hernia.  You will continue to take PPI (Protonix) once daily 20 to 30 mins before Breakfast.   Follow up with GI, Dr. Gutiérrez in one week to f[facility] presence of a bacteria called Helcobacter Pylori which if positive will need treatment with a course of two antibiotics and PPI.         SECONDARY DISCHARGE DIAGNOSES  Diagnosis: Chest pain  Assessment and Plan of Treatment: You had some substernal pain likely from epigastric pain. Serial cardiac enzymes were negative for Acute Coronary syndrome (ACS).   We also did p Echocardiogram showing Trace mitral regurgitation. Normal left ventricular internal dimensions and wall thicknesses, normal left ventricular systolic function and normal right ventricular size and function.       Diagnosis: HTN (hypertension)  Assessment and Plan of Treatment: BP well controlled; Please continue  Atenolol.       Diagnosis: HLD (hyperlipidemia)  Assessment and Plan of Treatment: Continue statin.    Diagnosis: Hypothyroidism  Assessment and Plan of Treatment: c/w synthroid.

## 2019-05-28 NOTE — DISCHARGE NOTE PROVIDER - HOSPITAL COURSE
Patient is a 64y old  Female who presents with a chief complaint of epigastric / chest pain.  Troponins were negative.  Abdominal CT shows no evidence of pancreatitis.  GI consulted, EGD performed 5/29 Patient is a 64y old  Female who presents with a chief complaint of epigastric / chest pain. Troponin negative.  She is s/p TTE showing trace mitral regurgitation. Normal left ventricular internal dimensions and wall thicknesses, normal left ventricular systolic function and normal right ventricular size and function. Serum lipase increased however no evidence of pancreatitis on CT. she is s/p EGD showing Patient is a 64y old  Female who presents with a chief complaint of epigastric / chest pain. Troponin negative.  She is s/p TTE showing trace mitral regurgitation. Normal left ventricular internal dimensions and wall thicknesses, normal left ventricular systolic function and normal right ventricular size and function. Serum lipase increased however no evidence of pancreatitis on CT. she is s/p EGD showing mild gastritis and hiatus hernia.

## 2019-05-28 NOTE — DISCHARGE NOTE PROVIDER - NSDCFUADDINST_GEN_ALL_CORE_FT
Please follow up with PCP in one week  Follow up with GI Dr. Ventura as per information provided to follow up EGD Biopsy results.

## 2019-05-28 NOTE — PROGRESS NOTE ADULT - ASSESSMENT
64yof presented to ED with a chief complaint of epigastric / chest pain on May 26.  Today she is sitting up in bed, resting comfortably without any pain. 64yof presented to ED with a chief complaint of epigastric / chest pain on May 26.  Today she is sitting up in bed, denies N&V,  resting comfortably without any pain.

## 2019-05-28 NOTE — DISCHARGE NOTE PROVIDER - CARE PROVIDERS DIRECT ADDRESSES
[email or web address]

## 2019-05-28 NOTE — PROGRESS NOTE ADULT - SUBJECTIVE AND OBJECTIVE BOX
NP Note discussed with  Primary Attending    Patient is a 64y old  Female who presents with a chief complaint of epigastric / chest pain (27 May 2019 17:29)      INTERVAL HPI/OVERNIGHT EVENTS: no new complaints    MEDICATIONS  (STANDING):  aspirin enteric coated 81 milliGRAM(s) Oral daily  ATENolol  Tablet 50 milliGRAM(s) Oral daily  heparin  Injectable 5000 Unit(s) SubCutaneous every 8 hours  levothyroxine 112 MICROGram(s) Oral daily  pantoprazole  Injectable 40 milliGRAM(s) IV Push every 12 hours  simvastatin 20 milliGRAM(s) Oral at bedtime  sodium chloride 0.9% with potassium chloride 20 mEq/L 1000 milliLiter(s) (100 mL/Hr) IV Continuous <Continuous>    MEDICATIONS  (PRN):      __________________________________________________  REVIEW OF SYSTEMS:    CONSTITUTIONAL: No fever,   EYES: no acute visual disturbances  NECK: No pain or stiffness  RESPIRATORY: No cough; No shortness of breath  CARDIOVASCULAR: No chest pain, no palpitations  GASTROINTESTINAL: No pain. No nausea or vomiting; No diarrhea   NEUROLOGICAL: No headache or numbness, no tremors  MUSCULOSKELETAL: No joint pain, no muscle pain  GENITOURINARY: no dysuria, no frequency, no hesitancy  PSYCHIATRY: no depression , no anxiety  ALL OTHER  ROS negative        Vital Signs Last 24 Hrs  T(C): 36.4 (28 May 2019 05:10), Max: 37 (27 May 2019 20:50)  T(F): 97.5 (28 May 2019 05:10), Max: 98.6 (27 May 2019 20:50)  HR: 56 (28 May 2019 05:10) (56 - 72)  BP: 148/68 (28 May 2019 05:10) (131/61 - 148/68)  BP(mean): --  RR: 16 (28 May 2019 05:10) (16 - 16)  SpO2: 98% (28 May 2019 05:10) (97% - 98%)    ________________________________________________  PHYSICAL EXAM:  GENERAL: NAD  HEENT: Normocephalic;  conjunctivae and sclerae clear; moist mucous membranes;   NECK : supple  CHEST/LUNG: Clear to auscultation bilaterally with good air entry   HEART: S1 S2  regular; no murmurs, gallops or rubs  ABDOMEN: Soft, Nontender, Nondistended; Bowel sounds present  EXTREMITIES: no cyanosis; no edema; no calf tenderness  SKIN: warm and dry; no rash  NERVOUS SYSTEM:  Awake and alert; Oriented  to place, person and time ; no new deficits    _________________________________________________  LABS:                        12.4   7.84  )-----------( 208      ( 28 May 2019 07:52 )             37.2     05-28    143  |  112<H>  |  7   ----------------------------<  80  4.1   |  25  |  0.87    Ca    8.4      28 May 2019 07:52  Phos  3.1     05-28  Mg     2.1     05-28    TPro  6.5  /  Alb  3.1<L>  /  TBili  0.7  /  DBili  x   /  AST  29  /  ALT  32  /  AlkPhos  53  05-27        CAPILLARY BLOOD GLUCOSE            RADIOLOGY & ADDITIONAL TESTS:    Imaging Personally Reviewed:  YES    < from: CT Abdomen and Pelvis w/ IV Cont (05.26.19 @ 18:15) >  INTERPRETATION:  Clinical information: Pancreatitis    Comparison: 2017    PROCEDURE:   CT of the Abdomen and Pelvis was performed with intravenous contrast.  90ml of Omnipaque 350 was injected intravenously. 10cc was discarded.    FINDINGS:    LOWER CHEST: Within normal limits    ABDOMEN:  LIVER: Within normal limits  BILE DUCTS: Normal caliber  GALLBLADDER: Cholecystectomy  PANCREAS: Within normal limits. Mild fat stranding and mesenteric lymph   nodes at the root of the mesentery adjacent to the pancreatic body,   similar to prior.  SPLEEN: Within normal limits  ADRENALS: Within normal limits  KIDNEYS: Bilateral renal hypodensities, some indeterminate, some   especially right posterior interpolar mildly enlarged from prior   measuring 1.1 cm. It be followed with nonemergent contrast-enhanced MRI.    PELVIS:  REPRODUCTIVE ORGANS: No pelvic masses  BLADDER: Within normal limits    PERITONEUM:No ascites, no free air.  BOWEL: Diverticulosis. Normal appendix.  VESSELS: Within normal limits  RETROPERITONEUM: No retroperitoneal or pelvic adenopathy  ABDOMINAL WALL: Within normal limits  MUSCULOSKELETAL: Within normal limits    IMPRESSION:     No CT evidence of pancreatitis. Stable from 2017 mesenteric   adenitis/panniculitis.    Incidental renal findings as above.    < end of copied text >      < from: US Hepatic & Pancreatic (05.26.19 @ 18:24) >  INTERPRETATION:  CLINICAL INFORMATION: Abdominal pain. Pancreatitis.    TECHNIQUE: Right upper quadrant abdominal ultrasound was performed.    COMPARISON: CT of the abdomen and pelvis from earlier the same day and   5/8/2017.    FINDINGS:     Liver: 15.7 cm with increased echogenicity suggestive of fatty   infiltration. No intrahepatic biliary ductal dilatation is demonstrated.     Gallbladder: The patient is status post a cholecystectomy. The common   bile duct is within normal limits measuring 4 mm.     Pancreas: Not well visualized.    Right kidney: 12.7 cm with a mid to lower pole cyst measuring 1.5 x 1.3 x   1.1 cm.    Vascular: The upper abdominal aorta is not well visualized. The   visualized IVC is within normal limits for size.    IMPRESSION:  1. Hepatic steatosis.  2. No common bile duct dilatation.    < end of copied text >      Consultant(s) Notes Reviewed:   YES/ No    Care Discussed with Consultants :     Plan of care was discussed with patient and /or primary care giver; all questions and concerns were addressed and care was aligned with patient's wishes.

## 2019-05-28 NOTE — DISCHARGE NOTE PROVIDER - NSDCFUSCHEDAPPT_GEN_ALL_CORE_FT
JESSE TROTTER ; 05/30/2019 ; [facility] PST-Presurgtest  JESSE TROTTER ; 06/13/2019 ; [facility] PreAdmits JESSE TROTTER ; 05/30/2019 ; [facility] PST-Presurgtest  JESSE TROTTER ; 06/13/2019 ; [facility] PreAdmits JESSE TROTTER ; 05/30/2019 ; [facility] PST-Presurgtest  JESSE TROTTER ; 06/13/2019 ; [facility] PreAdmits JESSE TROTTER ; 05/30/2019 ; [facility] PST-Presurgtest  JESSE TROTTER ; 06/13/2019 ; [facility] PreAdmits JESSE TROTTER ; 05/30/2019 ; [facility] PST-Presurgtest  JESSE TROTTER ; 06/13/2019 ; [facility] PreAdmits JESSE TROTTER ; 05/30/2019 ; [facility] PST-Presurgtest  JESSE TROTTER ; 06/13/2019 ; [facility] PreAdmits JESSE TROTTER ; 05/30/2019 ; [facility] PST-Presurgtest  JESSE TROTTER ; 06/13/2019 ; [facility] PreAdmits

## 2019-05-28 NOTE — CONSULT NOTE ADULT - SUBJECTIVE AND OBJECTIVE BOX
Patient is a 64y old  Female who presents with a chief complaint of epigastric / chest pain (28 May 2019 15:42)    64 year old female with a past medical history as below. She began to experience severe diffuse abdominal pain last night 10/10 with radiation, sharp, with radiation ot the back that improved after Gi cocktail in the ED last night. Since then she has not had further pain. She states she has chronic "upset stomach" but has not had an evaluation in some years.       REVIEW OF SYSTEMS  Constitutional:   No fever, no fatigue, no pallor, no night sweats, no weight loss.  HEENT:   No eye pain, no vision changes, no icterus, no mouth ulcers.  Respiratory:   No shortness of breath, no cough, no respiratory distress.   Cardiovascular:   No chest pain, no palpitations.   Gastrointestinal: + abdominal pain, no nausea, no vomiting , no diarrhea no constipation, no hematochezia, no melena.  Skin:   No rashes, no jaundice, no eczema.   Musculoskeletal:   No joint pain, no swelling, no myalgia.   Neurologic:   No headache, no seizure, no weakness.   Genitourinary:   No dysuria, no decreased urine output.  Psychiatric:  No depression, no anxiety,   Endocrine:   No thyroid disease, no diabetes.  Heme/Lymphatic:   No anemia, no blood transfusions, no lymph node enlargement, no bleeding, no bruising.  ___________________________________________________________________________________________  Allergies    No Known Allergies    Intolerances      MEDICATIONS  (STANDING):  aspirin enteric coated 81 milliGRAM(s) Oral daily  ATENolol  Tablet 50 milliGRAM(s) Oral daily  heparin  Injectable 5000 Unit(s) SubCutaneous every 8 hours  levothyroxine 112 MICROGram(s) Oral daily  pantoprazole    Tablet 40 milliGRAM(s) Oral two times a day  simvastatin 20 milliGRAM(s) Oral at bedtime  sodium chloride 0.9% with potassium chloride 20 mEq/L 1000 milliLiter(s) (100 mL/Hr) IV Continuous <Continuous>    MEDICATIONS  (PRN):      PAST MEDICAL & SURGICAL HISTORY:  H/O Clostridium difficile infection: treated 1 year ago  Diverticulitis  Renal stone: in 2013  HLD (hyperlipidemia)  HTN (hypertension)  Hyperthyroidism: s/p radiation  S/P cholecystectomy    FAMILY HISTORY:  Family history of CABG: father  FH: breast cancer: mother  FH: HTN (hypertension): mother    Social History: No history of : Tobacco use, IVDA, EToH  ______________________________________________________________________________________    PHYSICAL EXAM    Daily     Daily   BMI: 32.8 (05-27 @ 02:17)  Change in Weight:  Vital Signs Last 24 Hrs  T(C): 36.2 (28 May 2019 14:51), Max: 37 (27 May 2019 20:50)  T(F): 97.2 (28 May 2019 14:51), Max: 98.6 (27 May 2019 20:50)  HR: 50 (28 May 2019 14:51) (50 - 72)  BP: 141/67 (28 May 2019 14:51) (131/61 - 148/68)  BP(mean): --  RR: 16 (28 May 2019 14:51) (16 - 16)  SpO2: 98% (28 May 2019 14:51) (97% - 98%)    General:  Well developed, well nourished, alert and active, no pallor, NAD.  HEENT:    Normal appearance of conjunctiva, ears, nose, lips, oropharynx, and oral mucosa, anicteric.  Neck:  No masses, no asymmetry.  Lymph Nodes:  No lymphadenopathy.   Cardiovascular:  RRR normal S1/S2, no murmur.  Respiratory:  CTA B/L, normal respiratory effort.   Abdominal:   soft, no masses or tenderness, normoactive BS, NT/ND, no HSM.  Extremities:   No clubbing or cyanosis, normal capillary refill, no edema.   Skin:   No rash, jaundice, lesions, eczema.   Musculoskeletal:  No joint swelling, erythema or tenderness.   Neuro: No focal deficits.   Other:   _______________________________________________________________________________________________  Lab Results:                          12.4   7.84  )-----------( 208      ( 28 May 2019 07:52 )             37.2     05-28    143  |  112<H>  |  7   ----------------------------<  80  4.1   |  25  |  0.87    Ca    8.4      28 May 2019 07:52  Phos  3.1     05-28  Mg     2.1     05-28    TPro  6.5  /  Alb  3.1<L>  /  TBili  0.7  /  DBili  x   /  AST  29  /  ALT  32  /  AlkPhos  53  05-27    LIVER FUNCTIONS - ( 27 May 2019 07:23 )  Alb: 3.1 g/dL / Pro: 6.5 g/dL / ALK PHOS: 53 U/L / ALT: 32 U/L DA / AST: 29 U/L / GGT: x       Lipase 1707         CARDIAC MARKERS ( 27 May 2019 07:23 )  <0.015 ng/mL / x     / x     / x     / x      CARDIAC MARKERS ( 26 May 2019 22:40 )  <0.015 ng/mL / x     / x     / x     / x          Stool Results:          RADIOLOGY RESULTS:    EXAM:  CT ABDOMEN AND PELVIS IC                            PROCEDURE DATE:  05/26/2019          INTERPRETATION:  Clinical information: Pancreatitis    Comparison: 2017    PROCEDURE:   CT of the Abdomen and Pelvis was performed with intravenous contrast.  90ml of Omnipaque 350 was injected intravenously. 10cc was discarded.    FINDINGS:    LOWER CHEST: Within normal limits    ABDOMEN:  LIVER: Within normal limits  BILE DUCTS: Normal caliber  GALLBLADDER: Cholecystectomy  PANCREAS: Within normal limits. Mild fat stranding and mesenteric lymph   nodes at the root of the mesentery adjacent to the pancreatic body,   similar to prior.  SPLEEN: Within normal limits  ADRENALS: Within normal limits  KIDNEYS: Bilateral renal hypodensities, some indeterminate, some   especially right posterior interpolar mildly enlarged from prior   measuring 1.1 cm. It be followed with nonemergent contrast-enhanced MRI.    PELVIS:  REPRODUCTIVE ORGANS: No pelvic masses  BLADDER: Within normal limits    PERITONEUM:No ascites, no free air.  BOWEL: Diverticulosis. Normal appendix.  VESSELS: Within normal limits  RETROPERITONEUM: No retroperitoneal or pelvic adenopathy  ABDOMINAL WALL: Within normal limits  MUSCULOSKELETAL: Within normal limits    IMPRESSION:     No CT evidence of pancreatitis. Stable from 2017 mesenteric   adenitis/panniculitis.    Incidental renal findings as above.                      ARSH FAJARDO M.D., ATTENDING RADIOLOGIST  This document has been electronically signed. May 26 2019  6:23PM              SURGICAL PATHOLOGY:

## 2019-05-28 NOTE — DISCHARGE NOTE PROVIDER - CARE PROVIDER_API CALL
Evin Ventura)  Gastroenterology; Internal Medicine  [address] [place] [address]  Phone: [phone]  Fax: [phone]  Follow Up Time:     Mini Tabor)  Family Medicine  [address] [place] [address]  Phone: [phone]  Fax: [phone]  Follow Up Time:

## 2019-05-28 NOTE — PROGRESS NOTE ADULT - ATTENDING COMMENTS
Patient seen and examined earlier this afternoon around 12.15 PM; Agree with NP A/P above with editing as needed. Discussed with HAMMAD Lorenz.    Patient is OOB to chair; No acute distress. No abdominal pain today. Tolerated clears. Was kept NPO this morning awaiting GI eval for possible EGD Add on but could not get a slot as Endoscopy was heavily booked. No new complaints.    Vital Signs Last 24 Hrs  T(C): 36.2 (28 May 2019 14:51), Max: 37 (27 May 2019 20:50)  T(F): 97.2 (28 May 2019 14:51), Max: 98.6 (27 May 2019 20:50)  HR: 50 (28 May 2019 14:51) (50 - 72)  BP: 141/67 (28 May 2019 14:51) (131/61 - 148/68)  RR: 16 (28 May 2019 14:51) (16 - 16)  SpO2: 98% (28 May 2019 14:51) (97% - 98%)    P/E;  Neuro: AAO x3; No focal deficits  CVS: s1S2 present, regular  Resp: BLAE+, No wheeze or Rhonchi  GI: Soft, BS+, NT, ND  Extr; No edema or calf tenderness B/L LE    Labs:                        12.4   7.84  )-----------( 208      ( 28 May 2019 07:52 )             37.2   05-28    143  |  112<H>  |  7   ----------------------------<  80  4.1   |  25  |  0.87    Ca    8.4      28 May 2019 07:52  Phos  3.1     05-28  Mg     2.1     05-28    TPro  6.5  /  Alb  3.1<L>  /  TBili  0.7  /  DBili  x   /  AST  29  /  ALT  32  /  AlkPhos  53  05-27 Patient seen and examined earlier this afternoon around 12.15 PM; Agree with NP A/P above with editing as needed. Discussed with NP Kelechi.    Patient is OOB to chair; No acute distress. No abdominal pain today. Tolerated clears. Was kept NPO this morning awaiting GI eval for possible EGD Add on but could not get a slot as Endoscopy was heavily booked. No new complaints.    Vital Signs Last 24 Hrs  T(C): 36.2 (28 May 2019 14:51), Max: 37 (27 May 2019 20:50)  T(F): 97.2 (28 May 2019 14:51), Max: 98.6 (27 May 2019 20:50)  HR: 50 (28 May 2019 14:51) (50 - 72)  BP: 141/67 (28 May 2019 14:51) (131/61 - 148/68)  RR: 16 (28 May 2019 14:51) (16 - 16)  SpO2: 98% (28 May 2019 14:51) (97% - 98%)    P/E;  Neuro: AAO x3; No focal deficits  CVS: s1S2 present, regular  Resp: BLAE+, No wheeze or Rhonchi  GI: Soft, BS+, NT, ND  Extr; No edema or calf tenderness B/L LE    Labs:                        12.4   7.84  )-----------( 208      ( 28 May 2019 07:52 )             37.2   05-28    143  |  112<H>  |  7   ----------------------------<  80  4.1   |  25  |  0.87    Ca    8.4      28 May 2019 07:52  Phos  3.1     05-28  Mg     2.1     05-28    TPro  6.5  /  Alb  3.1<L>  /  TBili  0.7  /  DBili  x   /  AST  29  /  ALT  32  /  AlkPhos  53  05-27    Thyroid Stimulating Hormone, Serum in AM (05.28.19 @ 07:52)    Thyroid Stimulating Hormone, Serum: 2.18 uU/mL      D/D:  Epigastric pain and elevated Lipase concerning for Peptic Ulcer disease likely and less likely Pancreatitis  Hx Hypothyroid    Plan:  Continue PPI; Switch to oral route twice daily  Discussed with GI earlier today, unable to squeeze into schedule for EGD today but scheduled for tomorrow  Will advance diet to Soft, low fat, low residue diet for this evening and then NPO after Midnight.   Continue Synthroid  D/C Home tomorrow depending on EGD findings if diet is tolerated and no recurrent pain.     Discussed with patient findings and plan of care  Discussed with GI, NP Kelechi and EMERALD Henderson Patient seen and examined earlier this afternoon around 12.15 PM; Agree with NP A/P above with editing as needed. Discussed with NP Kelechi.    Patient is OOB to chair; No acute distress. No abdominal pain today. Tolerated clears. Was kept NPO this morning awaiting GI eval for possible EGD Add on but could not get a slot as Endoscopy was heavily booked. No new complaints.    Vital Signs Last 24 Hrs  T(C): 36.2 (28 May 2019 14:51), Max: 37 (27 May 2019 20:50)  T(F): 97.2 (28 May 2019 14:51), Max: 98.6 (27 May 2019 20:50)  HR: 50 (28 May 2019 14:51) (50 - 72)  BP: 141/67 (28 May 2019 14:51) (131/61 - 148/68)  RR: 16 (28 May 2019 14:51) (16 - 16)  SpO2: 98% (28 May 2019 14:51) (97% - 98%)    P/E;  Neuro: AAO x3; No focal deficits  CVS: s1S2 present, regular  Resp: BLAE+, No wheeze or Rhonchi  GI: Soft, BS+, NT, ND  Extr; No edema or calf tenderness B/L LE    Labs:                        12.4   7.84  )-----------( 208      ( 28 May 2019 07:52 )             37.2   05-28    143  |  112<H>  |  7   ----------------------------<  80  4.1   |  25  |  0.87    Ca    8.4      28 May 2019 07:52  Phos  3.1     05-28  Mg     2.1     05-28    TPro  6.5  /  Alb  3.1<L>  /  TBili  0.7  /  DBili  x   /  AST  29  /  ALT  32  /  AlkPhos  53  05-27    Thyroid Stimulating Hormone, Serum in AM (05.28.19 @ 07:52)    Thyroid Stimulating Hormone, Serum: 2.18 uU/mL      D/D:  Epigastric pain and elevated Lipase concerning for Peptic Ulcer disease likely and less likely Pancreatitis  ACS ruled out doubt ischemic etiology for Epigastric pain  Hx Hypothyroid    Plan:  Continue PPI; Switch to oral route twice daily  Discussed with GI earlier today, unable to squeeze into schedule for EGD today but scheduled for tomorrow  Will advance diet to Soft, low fat, low residue diet for this evening and then NPO after Midnight.   Continue Synthroid  Follow up Echo  D/C Home tomorrow depending on EGD findings if diet is tolerated and no recurrent pain.     Discussed with patient findings and plan of care  Discussed with GI, HAMMAD Lorenz and EMERALD Henderson

## 2019-05-28 NOTE — CONSULT NOTE ADULT - ASSESSMENT
Assessment     67 year old female with sudden onset of severe sharp epigastric pain. Although Lipase is elevated she is not at risk for pancreatitic (no GB, non smoker, no transaminitis Addtionally the relative resolution of her symptoms are not consistent  with pancreatitis., Additionally she no longer has pain (< 24 hours) and no imaging consistnet with pancreatitis.         Plan:  Advance diet today   We will rule out PUD tomorrow with an EGD   NPO post midnight

## 2019-05-28 NOTE — PROGRESS NOTE ADULT - PROBLEM SELECTOR PLAN 1
CT abdomen/pelvis negative for pancreatitis, some fat stranding with mesenteric lymphadenopathy noted  c/w protonix  f/u GI consult - Dr. Ventura Awaiting IgG results  c/w protonix  f/u GI consult - Dr. Ventura Awaiting IgG results  c/w protonix  EGD in AM with Dr. Ventura

## 2019-05-29 ENCOUNTER — TRANSCRIPTION ENCOUNTER (OUTPATIENT)
Age: 64
End: 2019-05-29

## 2019-05-29 ENCOUNTER — RESULT REVIEW (OUTPATIENT)
Age: 64
End: 2019-05-29

## 2019-05-29 VITALS
SYSTOLIC BLOOD PRESSURE: 140 MMHG | OXYGEN SATURATION: 95 % | TEMPERATURE: 98 F | HEART RATE: 54 BPM | RESPIRATION RATE: 17 BRPM | DIASTOLIC BLOOD PRESSURE: 65 MMHG

## 2019-05-29 LAB
ANION GAP SERPL CALC-SCNC: 7 MMOL/L — SIGNIFICANT CHANGE UP (ref 5–17)
BUN SERPL-MCNC: 7 MG/DL — SIGNIFICANT CHANGE UP (ref 7–18)
CALCIUM SERPL-MCNC: 8.5 MG/DL — SIGNIFICANT CHANGE UP (ref 8.4–10.5)
CHLORIDE SERPL-SCNC: 109 MMOL/L — HIGH (ref 96–108)
CO2 SERPL-SCNC: 25 MMOL/L — SIGNIFICANT CHANGE UP (ref 22–31)
CREAT SERPL-MCNC: 0.87 MG/DL — SIGNIFICANT CHANGE UP (ref 0.5–1.3)
GLUCOSE SERPL-MCNC: 75 MG/DL — SIGNIFICANT CHANGE UP (ref 70–99)
HCT VFR BLD CALC: 37 % — SIGNIFICANT CHANGE UP (ref 34.5–45)
HGB BLD-MCNC: 12.5 G/DL — SIGNIFICANT CHANGE UP (ref 11.5–15.5)
IGG SERPL-MCNC: 996 MG/DL — SIGNIFICANT CHANGE UP (ref 700–1600)
IGG1 SER-MCNC: 655 MG/DL — SIGNIFICANT CHANGE UP (ref 248–810)
IGG2 SER-MCNC: 318 MG/DL — SIGNIFICANT CHANGE UP (ref 130–555)
IGG3 SER-MCNC: 82 MG/DL — SIGNIFICANT CHANGE UP (ref 15–102)
IGG4 SER-MCNC: 27 MG/DL — SIGNIFICANT CHANGE UP (ref 2–96)
MCHC RBC-ENTMCNC: 29.6 PG — SIGNIFICANT CHANGE UP (ref 27–34)
MCHC RBC-ENTMCNC: 33.8 GM/DL — SIGNIFICANT CHANGE UP (ref 32–36)
MCV RBC AUTO: 87.7 FL — SIGNIFICANT CHANGE UP (ref 80–100)
NRBC # BLD: 0 /100 WBCS — SIGNIFICANT CHANGE UP (ref 0–0)
PLATELET # BLD AUTO: 215 K/UL — SIGNIFICANT CHANGE UP (ref 150–400)
POTASSIUM SERPL-MCNC: 3.9 MMOL/L — SIGNIFICANT CHANGE UP (ref 3.5–5.3)
POTASSIUM SERPL-SCNC: 3.9 MMOL/L — SIGNIFICANT CHANGE UP (ref 3.5–5.3)
RBC # BLD: 4.22 M/UL — SIGNIFICANT CHANGE UP (ref 3.8–5.2)
RBC # FLD: 12.2 % — SIGNIFICANT CHANGE UP (ref 10.3–14.5)
SODIUM SERPL-SCNC: 141 MMOL/L — SIGNIFICANT CHANGE UP (ref 135–145)
WBC # BLD: 7.52 K/UL — SIGNIFICANT CHANGE UP (ref 3.8–10.5)
WBC # FLD AUTO: 7.52 K/UL — SIGNIFICANT CHANGE UP (ref 3.8–10.5)

## 2019-05-29 PROCEDURE — 99239 HOSP IP/OBS DSCHRG MGMT >30: CPT | Mod: GC

## 2019-05-29 PROCEDURE — 86803 HEPATITIS C AB TEST: CPT

## 2019-05-29 PROCEDURE — 82787 IGG 1 2 3 OR 4 EACH: CPT

## 2019-05-29 PROCEDURE — 84484 ASSAY OF TROPONIN QUANT: CPT

## 2019-05-29 PROCEDURE — 80048 BASIC METABOLIC PNL TOTAL CA: CPT

## 2019-05-29 PROCEDURE — 88305 TISSUE EXAM BY PATHOLOGIST: CPT

## 2019-05-29 PROCEDURE — 36415 COLL VENOUS BLD VENIPUNCTURE: CPT

## 2019-05-29 PROCEDURE — 88305 TISSUE EXAM BY PATHOLOGIST: CPT | Mod: 26

## 2019-05-29 PROCEDURE — 93306 TTE W/DOPPLER COMPLETE: CPT

## 2019-05-29 PROCEDURE — 88312 SPECIAL STAINS GROUP 1: CPT

## 2019-05-29 PROCEDURE — 88312 SPECIAL STAINS GROUP 1: CPT | Mod: 26

## 2019-05-29 PROCEDURE — 84100 ASSAY OF PHOSPHORUS: CPT

## 2019-05-29 PROCEDURE — 83690 ASSAY OF LIPASE: CPT

## 2019-05-29 PROCEDURE — 93005 ELECTROCARDIOGRAM TRACING: CPT

## 2019-05-29 PROCEDURE — 71046 X-RAY EXAM CHEST 2 VIEWS: CPT

## 2019-05-29 PROCEDURE — 99285 EMERGENCY DEPT VISIT HI MDM: CPT | Mod: 25

## 2019-05-29 PROCEDURE — 80061 LIPID PANEL: CPT

## 2019-05-29 PROCEDURE — 80053 COMPREHEN METABOLIC PANEL: CPT

## 2019-05-29 PROCEDURE — 43239 EGD BIOPSY SINGLE/MULTIPLE: CPT

## 2019-05-29 PROCEDURE — 84443 ASSAY THYROID STIM HORMONE: CPT

## 2019-05-29 PROCEDURE — 96375 TX/PRO/DX INJ NEW DRUG ADDON: CPT

## 2019-05-29 PROCEDURE — 83735 ASSAY OF MAGNESIUM: CPT

## 2019-05-29 PROCEDURE — 96374 THER/PROPH/DIAG INJ IV PUSH: CPT

## 2019-05-29 PROCEDURE — 85027 COMPLETE CBC AUTOMATED: CPT

## 2019-05-29 PROCEDURE — 76705 ECHO EXAM OF ABDOMEN: CPT

## 2019-05-29 PROCEDURE — 74177 CT ABD & PELVIS W/CONTRAST: CPT

## 2019-05-29 RX ORDER — PANTOPRAZOLE SODIUM 20 MG/1
1 TABLET, DELAYED RELEASE ORAL
Qty: 30 | Refills: 0
Start: 2019-05-29 | End: 2019-06-27

## 2019-05-29 RX ORDER — ASPIRIN/CALCIUM CARB/MAGNESIUM 324 MG
1 TABLET ORAL
Qty: 30 | Refills: 0
Start: 2019-05-29 | End: 2019-06-27

## 2019-05-29 RX ADMIN — PANTOPRAZOLE SODIUM 40 MILLIGRAM(S): 20 TABLET, DELAYED RELEASE ORAL at 18:27

## 2019-05-29 RX ADMIN — DEXTROSE MONOHYDRATE, SODIUM CHLORIDE, AND POTASSIUM CHLORIDE 100 MILLILITER(S): 50; .745; 4.5 INJECTION, SOLUTION INTRAVENOUS at 07:03

## 2019-05-29 RX ADMIN — Medication 112 MICROGRAM(S): at 07:04

## 2019-05-29 RX ADMIN — Medication 81 MILLIGRAM(S): at 12:49

## 2019-05-29 RX ADMIN — HEPARIN SODIUM 5000 UNIT(S): 5000 INJECTION INTRAVENOUS; SUBCUTANEOUS at 14:38

## 2019-05-29 RX ADMIN — ATENOLOL 50 MILLIGRAM(S): 25 TABLET ORAL at 07:04

## 2019-05-29 RX ADMIN — PANTOPRAZOLE SODIUM 40 MILLIGRAM(S): 20 TABLET, DELAYED RELEASE ORAL at 07:03

## 2019-05-29 NOTE — PROGRESS NOTE ADULT - PROBLEM SELECTOR PLAN 6
IMPROVE VTE Individual Risk Assessment          RISK                                                          Points  [  ] Previous VTE                                                3  [  ] Thrombophilia                                             2  [  ] Lower limb paralysis                                   2        (unable to hold up >15 seconds)    [  ] Current Cancer                                             2         (within 6 months)  [ x ] Immobilization > 24 hrs                              1  [  ] ICU/CCU stay > 24 hours                             1  [ x ] Age > 60                                                         1    IMPROVE VTE Score: 2    c/w lovenox for vte prophylaxis
c/w synthroid
c/w heparin  c/w protonix

## 2019-05-29 NOTE — DISCHARGE NOTE NURSING/CASE MANAGEMENT/SOCIAL WORK - NSDCDPATPORTLINK_GEN_ALL_CORE
You can access the [facility] Patient Portal, offered by [facility], by registering with the following website: [email or web address]

## 2019-05-29 NOTE — PROGRESS NOTE ADULT - ASSESSMENT
63yo f presented to ED with a chief complaint of epigastric / chest pain. Troponin negative.  Serum lipase increased however no evidence of pancreatitis on CT.  Patient seen and examined lying in bed in NAD. States abdominal pain improving. Reports two loose stools overnight after eating soft diet. No nausea, no vomiting. Now NPO for GI procedure this am.

## 2019-05-29 NOTE — PROGRESS NOTE ADULT - PROBLEM SELECTOR PLAN 2
Resolved  negative troponin  s/p TTE showing Trace mitral regurgitation. Normal left ventricular internal dimensions and wall thicknesses, normal left ventricular systolic function and normal right ventricular size and function.

## 2019-05-29 NOTE — PROGRESS NOTE ADULT - SUBJECTIVE AND OBJECTIVE BOX
NP Note discussed with  Primary Attending    Patient is a 64y old  Female who presents with a chief complaint of epigastric / chest pain (28 May 2019 16:41)      INTERVAL HPI/OVERNIGHT EVENTS: no new complaints    MEDICATIONS  (STANDING):  aspirin enteric coated 81 milliGRAM(s) Oral daily  ATENolol  Tablet 50 milliGRAM(s) Oral daily  heparin  Injectable 5000 Unit(s) SubCutaneous every 8 hours  levothyroxine 112 MICROGram(s) Oral daily  pantoprazole    Tablet 40 milliGRAM(s) Oral two times a day  simvastatin 20 milliGRAM(s) Oral at bedtime  sodium chloride 0.9% with potassium chloride 20 mEq/L 1000 milliLiter(s) (100 mL/Hr) IV Continuous <Continuous>    MEDICATIONS  (PRN):  loperamide 2 milliGRAM(s) Oral every 4 hours PRN Diarrhea      __________________________________________________  REVIEW OF SYSTEMS:    CONSTITUTIONAL: No fever,   EYES: no acute visual disturbances  NECK: No pain or stiffness  RESPIRATORY: No cough; No shortness of breath  CARDIOVASCULAR: No chest pain, no palpitations  GASTROINTESTINAL: No pain. No nausea or vomiting; No diarrhea   NEUROLOGICAL: No headache or numbness, no tremors  MUSCULOSKELETAL: No joint pain, no muscle pain  GENITOURINARY: no dysuria, no frequency, no hesitancy  PSYCHIATRY: no depression , no anxiety  ALL OTHER  ROS negative        Vital Signs Last 24 Hrs  T(C): 36.7 (29 May 2019 05:05), Max: 36.7 (29 May 2019 05:05)  T(F): 98 (29 May 2019 05:05), Max: 98 (29 May 2019 05:05)  HR: 56 (29 May 2019 05:05) (50 - 60)  BP: 148/72 (29 May 2019 05:05) (141/67 - 149/63)  BP(mean): --  RR: 18 (29 May 2019 05:05) (16 - 18)  SpO2: 95% (29 May 2019 05:05) (95% - 98%)    ________________________________________________  PHYSICAL EXAM:  GENERAL: NAD  HEENT: Normocephalic;  conjunctivae and sclerae clear; moist mucous membranes;   NECK : supple  CHEST/LUNG: Clear to auscultation bilaterally with good air entry   HEART: S1 S2  regular; no murmurs, gallops or rubs  ABDOMEN: Soft, Nontender, Nondistended; Bowel sounds present  EXTREMITIES: no cyanosis; no edema; no calf tenderness  SKIN: warm and dry; no rash  NERVOUS SYSTEM:  Awake and alert; Oriented  to place, person and time ; no new deficits    _________________________________________________  LABS:                        12.5   7.52  )-----------( 215      ( 29 May 2019 06:50 )             37.0     05-29    141  |  109<H>  |  7   ----------------------------<  75  3.9   |  25  |  0.87    Ca    8.5      29 May 2019 06:50  Phos  3.1     05-28  Mg     2.1     05-28          CAPILLARY BLOOD GLUCOSE            RADIOLOGY & ADDITIONAL TESTS:      Plan of care was discussed with patient and /or primary care giver; all questions and concerns were addressed and care was aligned with patient's wishes.

## 2019-05-29 NOTE — DISCHARGE NOTE NURSING/CASE MANAGEMENT/SOCIAL WORK - CAREGIVER ADDRESS
[facility]

## 2019-05-29 NOTE — PROGRESS NOTE ADULT - PROBLEM SELECTOR PLAN 1
No evidence of pancreatitis on CT  elevated lipase concerning for PUD  NPO for EGD today with Dr. Gutiérrez

## 2019-05-29 NOTE — CHART NOTE - NSCHARTNOTEFT_GEN_A_CORE
EVENT:   EKG - NSR, Trop. #1 < 0.015, Trop. #2 < 0.015. Patient denied chest pain or any chest discomfort. Discussed with PGY III Dr. Ruff. Remote Tele/EKG at bedside was D/C. Patient's waiting to be send to [facility]. floor.   OBJECTIVE:  Vital Signs Last 24 Hrs  T(C): 36.8 (26 May 2019 20:22), Max: 36.8 (26 May 2019 20:22)  T(F): 98.3 (26 May 2019 20:22), Max: 98.3 (26 May 2019 20:22)  HR: 63 (26 May 2019 20:22) (62 - 63)  BP: 137/61 (26 May 2019 20:22) (118/72 - 137/61)  BP(mean): --  RR: 17 (26 May 2019 20:22) (16 - 17)  SpO2: 98% (26 May 2019 20:22) (98% - 100%)    FOCUSED PHYSICAL EXAM:    LABS:                        13.5   10.65 )-----------( 238      ( 26 May 2019 16:22 )             39.9   CARDIAC MARKERS ( 26 May 2019 22:40 )  <0.015 ng/mL / x     / x     / x     / x      CARDIAC MARKERS ( 26 May 2019 16:22 )  <0.015 ng/mL / x     / x     / x     / x        05-26    142  |  107  |  20<H>  ----------------------------<  110<H>  3.4<L>   |  27  |  0.96    Ca    8.8      26 May 2019 16:22    TPro  7.6  /  Alb  3.5  /  TBili  0.6  /  DBili  x   /  AST  56<H>  /  ALT  39  /  AlkPhos  65  05-26      EKG:   IMAGING:    ASSESSMENT:  HPI:  64 F with PMH of cholecystectomy, hypothyroidism, HTN, HLD, diverticulitis, episode of C. diff infection in the past, presents to ED due to epigastric pain which she describes as a squeezing sensation radiating towards the back and flank starting today at approx 2PM.  Pt states that last night she had nausea with NBNB vomiting and nonbloody watery diarrhea 1 AM earlier today after having eaten at BBQ including steak, corn, and corndogs. Pt had toast with butter at approx 2pm which then initiated the epigastric pain with radiation to back and chest. Pt otherwise denies fever, chills, sweating, shortness of breath, all other ROS negative. Pt does admit to feeling shaky with temporary numbness of bilateral fingers and lips. (26 May 2019 18:27)      PLAN: F/u 6am Trop. #3.      FOLLOW UP / RESULT:
Esophagogastroduodenoscopy Report  Indication:  Dyspepsia   Referring MD: Dr. Galdamez   Instrument:  #[id number]  Anesthesia: MAC  Consent:  Informed consent was obtained from the patient after providing any opportunity for questions  Procedure: The gastroscope was gently passed through the incisoral orifice into the oral cavity and under direct visualization the esophagus was intubated. The endoscope was passed down the esophagus, through the stomach and into the 3rd portion. Color, texture, mucosa and anatomy of the esophagus, stomach, and duodenum were carefully examined with the scope. The patient tolerated the procedure well. After completion of the examination, the patient was transferred to the recovery room.   Preparation: NPO   Findings:   Oropharynx	Normal  Esophagus	4 cm hiatus hernia  EG-junction	-Normal appearing z-line at 30 cm from the incisors.   Cardia	Normal  Body	Mild erythema and edema. Random biopsy taken. [3]  Antrum	Mild erythema and edema.  Random biopsy taken.[2]  Pylorus	Normal  Duodenal Bulb	Normal  2nd portion	Normal. Radnom biopsies taken to evaluate villous structure [1]  3rd portion	Normal.  Date and time:5/29/2019 12:17:08 PM  EBL:0  Impression: 1- Mild gastritis  2- Hiatus hernia 3- No endoscopic pathology to explain dyspepsia     Plan: 1-  Resume diet and medications 2- Follow up pathology (treat H. pylori if present)     Procedure Start Time: 11:58 am   Procedure End Time:    12:02 am    Attending:       Evin Ventura M.D.   Date and Time: 5/29/2019 12:17:08 PM
Pt. with persisting nausea after breakfast, no vomiting noted.  Unable to advance diet at this time.  Zofran ordered.  Will continue monitoring.
EVENT: C/o loose stool since resuming food intake    OBJECTIVE:  Vital Signs Last 24 Hrs  T(C): 36.5 (28 May 2019 20:36), Max: 36.5 (28 May 2019 20:36)  T(F): 97.7 (28 May 2019 20:36), Max: 97.7 (28 May 2019 20:36)  HR: 60 (28 May 2019 20:36) (50 - 60)  BP: 149/63 (28 May 2019 20:36) (141/67 - 149/63)  BP(mean): --  RR: 17 (28 May 2019 20:36) (16 - 17)  SpO2: 97% (28 May 2019 20:36) (97% - 98%)    FOCUSED PHYSICAL EXAM:  ABDOMEN: Soft, Nontender, Nondistended, hyperactive bowel sounds  CHEST/LUNG: Clear to auscultation bilaterally; No wheezes or rales  HEART: Regular rate and rhythm; No murmurs, rubs, or gallops  EXTREMITIES:  2+ Peripheral Pulses, No clubbing, cyanosis, or edema  Neurological: AOx3, CN 2-12 grossly intact  Skin: Warm and dry  Musculoskeletal: Atraumatic, no joint effusions    LABS:                        12.4   7.84  )-----------( 208      ( 28 May 2019 07:52 )             37.2   CARDIAC MARKERS ( 27 May 2019 07:23 )  <0.015 ng/mL / x     / x     / x     / x      CARDIAC MARKERS ( 26 May 2019 22:40 )  <0.015 ng/mL / x     / x     / x     / x            143  |  112<H>  |  7   ----------------------------<  80  4.1   |  25  |  0.87    Ca    8.4      28 May 2019 07:52  Phos  3.1     -  Mg     2.1         TPro  6.5  /  Alb  3.1<L>  /  TBili  0.7  /  DBili  x   /  AST  29  /  ALT  32  /  AlkPhos  53        EKG:   IMAGIN19 CT Abd and Pelvis    LOWER CHEST: Within normal limits  ABDOMEN:  LIVER: Within normal limits  BILE DUCTS: Normal caliber  GALLBLADDER: Cholecystectomy  PANCREAS: Within normal limits. Mild fat stranding and mesenteric lymph   nodes at the root of the mesentery adjacent to the pancreatic body,   similar to prior.  SPLEEN: Within normal limits  ADRENALS: Within normal limits  KIDNEYS: Bilateral renal hypodensities, some indeterminate, some   especially right posterior interpolar mildly enlarged from prior   measuring 1.1 cm. It be followed with nonemergent contrast-enhanced MRI.  PELVIS:  REPRODUCTIVE ORGANS: No pelvic masses  BLADDER: Within normal limits  PERITONEUM:No ascites, no free air.  BOWEL: Diverticulosis. Normal appendix.  VESSELS: Within normal limits  RETROPERITONEUM: No retroperitoneal or pelvic adenopathy  ABDOMINAL WALL: Within normal limits  MUSCULOSKELETAL: Within normal limits  IMPRESSION:   No CT evidence of pancreatitis. Stable from 2017 mesenteric   adenitis/panniculitis.  Incidental renal findings as above.    ASSESSMENT:  HPI:  64 F with PMH of cholecystectomy, hypothyroidism, HTN, HLD, diverticulitis, episode of C. diff infection in the past, presents to ED due to epigastric pain which she describes as a squeezing sensation radiating towards the back and flank starting today at approx 2PM.  Pt states that last night she had nausea with NBNB vomiting and nonbloody watery diarrhea 1 AM earlier today after having eaten at BBQ including steak, corn, and corndogs. Pt had toast with butter at approx 2pm which then initiated the epigastric pain with radiation to back and chest. Pt otherwise denies fever, chills, sweating, shortness of breath, all other ROS negative. Pt does admit to feeling shaky with temporary numbness of bilateral fingers and lips. (26 May 2019 18:27)    PLAN:   1. Imodium 2 mg PRN  2. Cont sodium chloride 0.9% with potassium chloride 20 mEq/L 1000 milliLiter(s) (100 mL/Hr) IV  3. Cont pantoprazole  Injectable 40 milliGRAM(s) IV Push every 12 hours  4. NPO post midnight  to rule out PUD tomorrow with an EGD     FOLLOW UP / RESULT: Monitor loose stool after NPO status resume

## 2019-06-03 ENCOUNTER — OUTPATIENT (OUTPATIENT)
Dept: OUTPATIENT SERVICES | Facility: HOSPITAL | Age: 64
LOS: 1 days | End: 2019-06-03
Payer: COMMERCIAL

## 2019-06-03 VITALS
TEMPERATURE: 98 F | HEIGHT: 65 IN | RESPIRATION RATE: 14 BRPM | DIASTOLIC BLOOD PRESSURE: 85 MMHG | HEART RATE: 57 BPM | SYSTOLIC BLOOD PRESSURE: 126 MMHG | WEIGHT: 186.95 LBS | OXYGEN SATURATION: 98 %

## 2019-06-03 DIAGNOSIS — Z29.9 ENCOUNTER FOR PROPHYLACTIC MEASURES, UNSPECIFIED: ICD-10-CM

## 2019-06-03 DIAGNOSIS — N39.3 STRESS INCONTINENCE (FEMALE) (MALE): ICD-10-CM

## 2019-06-03 DIAGNOSIS — N81.11 CYSTOCELE, MIDLINE: ICD-10-CM

## 2019-06-03 DIAGNOSIS — Z98.890 OTHER SPECIFIED POSTPROCEDURAL STATES: Chronic | ICD-10-CM

## 2019-06-03 DIAGNOSIS — Z90.49 ACQUIRED ABSENCE OF OTHER SPECIFIED PARTS OF DIGESTIVE TRACT: Chronic | ICD-10-CM

## 2019-06-03 DIAGNOSIS — N81.2 INCOMPLETE UTEROVAGINAL PROLAPSE: ICD-10-CM

## 2019-06-03 DIAGNOSIS — Z01.818 ENCOUNTER FOR OTHER PREPROCEDURAL EXAMINATION: ICD-10-CM

## 2019-06-03 LAB
BLD GP AB SCN SERPL QL: NEGATIVE — SIGNIFICANT CHANGE UP
HBA1C BLD-MCNC: 5.2 % — SIGNIFICANT CHANGE UP (ref 4–5.6)
RH IG SCN BLD-IMP: POSITIVE — SIGNIFICANT CHANGE UP

## 2019-06-03 PROCEDURE — 83036 HEMOGLOBIN GLYCOSYLATED A1C: CPT

## 2019-06-03 PROCEDURE — 86901 BLOOD TYPING SEROLOGIC RH(D): CPT

## 2019-06-03 PROCEDURE — 86850 RBC ANTIBODY SCREEN: CPT

## 2019-06-03 PROCEDURE — G0463: CPT

## 2019-06-03 PROCEDURE — 86900 BLOOD TYPING SEROLOGIC ABO: CPT

## 2019-06-03 RX ORDER — CEFOTETAN DISODIUM 1 G
2 VIAL (EA) INJECTION ONCE
Refills: 0 | Status: DISCONTINUED | OUTPATIENT
Start: 2019-06-13 | End: 2019-06-14

## 2019-06-03 NOTE — H&P PST ADULT - NSICDXPASTMEDICALHX_GEN_ALL_CORE_FT
PAST MEDICAL HISTORY:  Diverticulitis     H/O Clostridium difficile infection treated 1 year ago    HLD (hyperlipidemia)     HTN (hypertension)     Hyperthyroidism s/p radiation 1994    Lactose intolerance     Renal stone in 2013 PAST MEDICAL HISTORY:  Diverticulitis resolved    Gastritis     H/O Clostridium difficile infection treated 1 year ago    HLD (hyperlipidemia)     HTN (hypertension)     Hyperthyroidism s/p radiation 1994    Lactose intolerance     Renal stone in 2013, passed without surgical intervention

## 2019-06-03 NOTE — H&P PST ADULT - HISTORY OF PRESENT ILLNESS
65yo with Stage 3 POP, uterovaginal prolapse, midline cystocele and rectocele and GSUI presents for preop counseling.     PMH: Hypothyroidism s/p radioablation, HTN, HLD  PSH: laparoscopic cholecystectomy, cervical lymph node excision  Meds: synthroid, atenolol, simvastatin  All: NKDA 63yo female. diagnsoed with Stage 3 POP, uterovaginal prolapse, midline cystocele and rectocele.  presents to PST scheduled for colpopexy, cystoscopy and hysterectomy surgery.     PMH: Hypothyroidism s/p radioablation, HTN, HLD, cholecystectomy, right cervical lymph node excision (benign).  pt was recently hospitalized at [facility] with c/o chest pain, cardiac work up- negative.  EGD revealed gastritis (please see discharge note dated august 5/29/2019 in HIE.

## 2019-06-03 NOTE — H&P PST ADULT - ASSESSMENT
CAPRINI SCORE    AGE RELATED RISK FACTORS                                                       MOBILITY RELATED FACTORS  [ ] Age 41-60 years                                            (1 Point)                  [ ] Bed rest                                                        (1 Point)  [x ] Age: 61-74 years                                           (2 Points)                [ ] Plaster cast                                                   (2 Points)  [ ] Age= 75 years                                              (3 Points)                 [ ] Bed bound for more than 72 hours                   (2 Points)    DISEASE RELATED RISK FACTORS                                               GENDER SPECIFIC FACTORS  [ ] Edema in the lower extremities                       (1 Point)                  [ ] Pregnancy                                                     (1 Point)  [ ] Varicose veins                                               (1 Point)                  [ ] Post-partum < 6 weeks                                   (1 Point)             [x ] BMI > 25 Kg/m2                                            (1 Point)                  [ ] Hormonal therapy  or oral contraception            (1 Point)                 [ ] Sepsis (in the previous month)                        (1 Point)                  [ ] History of pregnancy complications  [ ] Pneumonia or serious lung disease                                               [ ] Unexplained or recurrent                       (1 Point)           (in the previous month)                               (1 Point)  [ ] Abnormal pulmonary function test                     (1 Point)                 SURGERY RELATED RISK FACTORS  [ ] Acute myocardial infarction                              (1 Point)                 [ ]  Section                                            (1 Point)  [ ] Congestive heart failure (in the previous month)  (1 Point)                 [ ] Minor surgery                                                 (1 Point)   [ ] Inflammatory bowel disease                             (1 Point)                 [ ] Arthroscopic surgery                                        (2 Points)  [ ] Central venous access                                    (2 Points)                [ x] General surgery lasting more than 45 minutes   (2 Points)       [ ] Stroke (in the previous month)                          (5 Points)               [ ] Elective arthroplasty                                        (5 Points)                                                                                                                                               HEMATOLOGY RELATED FACTORS                                                 TRAUMA RELATED RISK FACTORS  [ ] Prior episodes of VTE                                     (3 Points)                 [ ] Fracture of the hip, pelvis, or leg                       (5 Points)  [ ] Positive family history for VTE                         (3 Points)                 [ ] Acute spinal cord injury (in the previous month)  (5 Points)  [ ] Prothrombin 52885 A                                      (3 Points)                 [ ] Paralysis  (less than 1 month)                          (5 Points)  [ ] Factor V Leiden                                             (3 Points)                 [ ] Multiple Trauma within 1 month                         (5 Points)  [ ] Lupus anticoagulants                                     (3 Points)                                                           [ ] Anticardiolipin antibodies                                (3 Points)                                                       [ ] High homocysteine in the blood                      (3 Points)                                             [ ] Other congenital or acquired thrombophilia       (3 Points)                                                [ ] Heparin induced thrombocytopenia                  (3 Points)                                          Total Score [        5  ]

## 2019-06-03 NOTE — H&P PST ADULT - NSICDXPASTSURGICALHX_GEN_ALL_CORE_FT
PAST SURGICAL HISTORY:  S/P cholecystectomy PAST SURGICAL HISTORY:  H/O lymph node biopsy right cervical lymph node biopsy - negative  2005    S/P cholecystectomy

## 2019-06-03 NOTE — H&P PST ADULT - NSANTHOSAYNRD_GEN_A_CORE
No. ASHLY screening performed.  STOP BANG Legend: 0-2 = LOW Risk; 3-4 = INTERMEDIATE Risk; 5-8 = HIGH Risk

## 2019-06-03 NOTE — H&P PST ADULT - NSICDXPROBLEM_GEN_ALL_CORE_FT
PROBLEM DIAGNOSES  Problem: Incomplete uterovaginal prolapse  Assessment and Plan: midurethral sling  cystosocopy  laparoscopic robotic sacral colpopexy  laparoscopic robotic supracervical hysterectomy    Problem: Need for prophylactic measure  Assessment and Plan: The Caprini score indicates this patient is at risk for a VTE event (score 3-5).  Most surgical patients in this group would benefit from pharmacologic prophylaxis.  The surgical team will determine the balance between VTE risk and bleeding risk

## 2019-06-12 ENCOUNTER — TRANSCRIPTION ENCOUNTER (OUTPATIENT)
Age: 64
End: 2019-06-12

## 2019-06-13 ENCOUNTER — APPOINTMENT (OUTPATIENT)
Dept: UROGYNECOLOGY | Facility: HOSPITAL | Age: 64
End: 2019-06-13
Payer: COMMERCIAL

## 2019-06-13 ENCOUNTER — RESULT REVIEW (OUTPATIENT)
Age: 64
End: 2019-06-13

## 2019-06-13 ENCOUNTER — INPATIENT (INPATIENT)
Facility: HOSPITAL | Age: 64
LOS: 0 days | Discharge: HOME | DRG: 743 | End: 2019-06-14
Attending: UROLOGY | Admitting: UROLOGY
Payer: COMMERCIAL

## 2019-06-13 VITALS
OXYGEN SATURATION: 98 % | WEIGHT: 186.95 LBS | HEART RATE: 54 BPM | SYSTOLIC BLOOD PRESSURE: 145 MMHG | TEMPERATURE: 98 F | RESPIRATION RATE: 20 BRPM | DIASTOLIC BLOOD PRESSURE: 84 MMHG | HEIGHT: 65 IN

## 2019-06-13 DIAGNOSIS — N81.2 INCOMPLETE UTEROVAGINAL PROLAPSE: ICD-10-CM

## 2019-06-13 DIAGNOSIS — N81.11 CYSTOCELE, MIDLINE: ICD-10-CM

## 2019-06-13 DIAGNOSIS — Z90.49 ACQUIRED ABSENCE OF OTHER SPECIFIED PARTS OF DIGESTIVE TRACT: Chronic | ICD-10-CM

## 2019-06-13 DIAGNOSIS — Z98.890 OTHER SPECIFIED POSTPROCEDURAL STATES: Chronic | ICD-10-CM

## 2019-06-13 LAB
GLUCOSE BLDC GLUCOMTR-MCNC: 90 MG/DL — SIGNIFICANT CHANGE UP (ref 70–99)
RH IG SCN BLD-IMP: POSITIVE — SIGNIFICANT CHANGE UP

## 2019-06-13 PROCEDURE — 58542 LSH W/T/O UT 250 G OR LESS: CPT

## 2019-06-13 PROCEDURE — 57425 LAPAROSCOPY SURG COLPOPEXY: CPT

## 2019-06-13 PROCEDURE — 57288 REPAIR BLADDER DEFECT: CPT

## 2019-06-13 PROCEDURE — 88305 TISSUE EXAM BY PATHOLOGIST: CPT | Mod: 26

## 2019-06-13 RX ORDER — KETOROLAC TROMETHAMINE 30 MG/ML
30 SYRINGE (ML) INJECTION EVERY 6 HOURS
Refills: 0 | Status: DISCONTINUED | OUTPATIENT
Start: 2019-06-13 | End: 2019-06-14

## 2019-06-13 RX ORDER — SIMETHICONE 80 MG/1
80 TABLET, CHEWABLE ORAL
Refills: 0 | Status: DISCONTINUED | OUTPATIENT
Start: 2019-06-13 | End: 2019-06-14

## 2019-06-13 RX ORDER — ASCORBIC ACID 60 MG
1 TABLET,CHEWABLE ORAL
Qty: 0 | Refills: 0 | DISCHARGE

## 2019-06-13 RX ORDER — SODIUM CHLORIDE 9 MG/ML
3 INJECTION INTRAMUSCULAR; INTRAVENOUS; SUBCUTANEOUS EVERY 8 HOURS
Refills: 0 | Status: DISCONTINUED | OUTPATIENT
Start: 2019-06-13 | End: 2019-06-13

## 2019-06-13 RX ORDER — PANTOPRAZOLE SODIUM 20 MG/1
40 TABLET, DELAYED RELEASE ORAL
Refills: 0 | Status: DISCONTINUED | OUTPATIENT
Start: 2019-06-13 | End: 2019-06-14

## 2019-06-13 RX ORDER — LEVOTHYROXINE SODIUM 125 MCG
1 TABLET ORAL
Qty: 0 | Refills: 0 | DISCHARGE

## 2019-06-13 RX ORDER — ACETAMINOPHEN 500 MG
1000 TABLET ORAL EVERY 6 HOURS
Refills: 0 | Status: COMPLETED | OUTPATIENT
Start: 2019-06-13 | End: 2019-06-13

## 2019-06-13 RX ORDER — BENZOCAINE AND MENTHOL 5; 1 G/100ML; G/100ML
1 LIQUID ORAL THREE TIMES A DAY
Refills: 0 | Status: DISCONTINUED | OUTPATIENT
Start: 2019-06-13 | End: 2019-06-14

## 2019-06-13 RX ORDER — DOCUSATE SODIUM 100 MG
100 CAPSULE ORAL THREE TIMES A DAY
Refills: 0 | Status: DISCONTINUED | OUTPATIENT
Start: 2019-06-13 | End: 2019-06-14

## 2019-06-13 RX ORDER — ACETAMINOPHEN 500 MG
975 TABLET ORAL ONCE
Refills: 0 | Status: COMPLETED | OUTPATIENT
Start: 2019-06-13 | End: 2019-06-13

## 2019-06-13 RX ORDER — ATENOLOL 25 MG/1
50 TABLET ORAL DAILY
Refills: 0 | Status: DISCONTINUED | OUTPATIENT
Start: 2019-06-13 | End: 2019-06-14

## 2019-06-13 RX ORDER — SODIUM CHLORIDE 9 MG/ML
1000 INJECTION, SOLUTION INTRAVENOUS
Refills: 0 | Status: DISCONTINUED | OUTPATIENT
Start: 2019-06-13 | End: 2019-06-14

## 2019-06-13 RX ORDER — OXYCODONE HYDROCHLORIDE 5 MG/1
5 TABLET ORAL EVERY 4 HOURS
Refills: 0 | Status: DISCONTINUED | OUTPATIENT
Start: 2019-06-13 | End: 2019-06-14

## 2019-06-13 RX ORDER — LIDOCAINE HCL 20 MG/ML
0.2 VIAL (ML) INJECTION ONCE
Refills: 0 | Status: DISCONTINUED | OUTPATIENT
Start: 2019-06-13 | End: 2019-06-13

## 2019-06-13 RX ORDER — CELECOXIB 200 MG/1
200 CAPSULE ORAL ONCE
Refills: 0 | Status: COMPLETED | OUTPATIENT
Start: 2019-06-13 | End: 2019-06-13

## 2019-06-13 RX ORDER — FAMOTIDINE 10 MG/ML
20 INJECTION INTRAVENOUS ONCE
Refills: 0 | Status: COMPLETED | OUTPATIENT
Start: 2019-06-13 | End: 2019-06-13

## 2019-06-13 RX ORDER — LEVOTHYROXINE SODIUM 125 MCG
112 TABLET ORAL DAILY
Refills: 0 | Status: DISCONTINUED | OUTPATIENT
Start: 2019-06-13 | End: 2019-06-14

## 2019-06-13 RX ORDER — ONDANSETRON 8 MG/1
4 TABLET, FILM COATED ORAL ONCE
Refills: 0 | Status: DISCONTINUED | OUTPATIENT
Start: 2019-06-13 | End: 2019-06-13

## 2019-06-13 RX ORDER — SIMVASTATIN 20 MG/1
1 TABLET, FILM COATED ORAL
Qty: 0 | Refills: 0 | DISCHARGE

## 2019-06-13 RX ORDER — OXYCODONE HYDROCHLORIDE 5 MG/1
10 TABLET ORAL EVERY 6 HOURS
Refills: 0 | Status: DISCONTINUED | OUTPATIENT
Start: 2019-06-13 | End: 2019-06-14

## 2019-06-13 RX ORDER — ATENOLOL 25 MG/1
1 TABLET ORAL
Qty: 0 | Refills: 0 | DISCHARGE

## 2019-06-13 RX ORDER — PANTOPRAZOLE SODIUM 20 MG/1
1 TABLET, DELAYED RELEASE ORAL
Qty: 0 | Refills: 0 | DISCHARGE

## 2019-06-13 RX ORDER — HYDROMORPHONE HYDROCHLORIDE 2 MG/ML
0.5 INJECTION INTRAMUSCULAR; INTRAVENOUS; SUBCUTANEOUS
Refills: 0 | Status: DISCONTINUED | OUTPATIENT
Start: 2019-06-13 | End: 2019-06-13

## 2019-06-13 RX ADMIN — Medication 30 MILLIGRAM(S): at 17:28

## 2019-06-13 RX ADMIN — Medication 30 MILLIGRAM(S): at 18:21

## 2019-06-13 RX ADMIN — FAMOTIDINE 20 MILLIGRAM(S): 10 INJECTION INTRAVENOUS at 22:08

## 2019-06-13 RX ADMIN — Medication 400 MILLIGRAM(S): at 17:28

## 2019-06-13 RX ADMIN — SIMETHICONE 80 MILLIGRAM(S): 80 TABLET, CHEWABLE ORAL at 22:08

## 2019-06-13 RX ADMIN — Medication 1000 MILLIGRAM(S): at 18:19

## 2019-06-13 RX ADMIN — SODIUM CHLORIDE 3 MILLILITER(S): 9 INJECTION INTRAMUSCULAR; INTRAVENOUS; SUBCUTANEOUS at 06:02

## 2019-06-13 RX ADMIN — Medication 30 MILLIGRAM(S): at 23:56

## 2019-06-13 RX ADMIN — SODIUM CHLORIDE 75 MILLILITER(S): 9 INJECTION, SOLUTION INTRAVENOUS at 23:59

## 2019-06-13 RX ADMIN — FAMOTIDINE 20 MILLIGRAM(S): 10 INJECTION INTRAVENOUS at 06:00

## 2019-06-13 RX ADMIN — BENZOCAINE AND MENTHOL 1 LOZENGE: 5; 1 LIQUID ORAL at 14:36

## 2019-06-13 RX ADMIN — Medication 975 MILLIGRAM(S): at 06:00

## 2019-06-13 RX ADMIN — BENZOCAINE AND MENTHOL 1 LOZENGE: 5; 1 LIQUID ORAL at 22:07

## 2019-06-13 RX ADMIN — CELECOXIB 200 MILLIGRAM(S): 200 CAPSULE ORAL at 06:00

## 2019-06-13 NOTE — BRIEF OPERATIVE NOTE - NSICDXBRIEFPREOP_GEN_ALL_CORE_FT
PRE-OP DIAGNOSIS:  Urinary incontinence 13-Jun-2019 12:23:08  Dana Gasca  Midline cystocele 13-Jun-2019 12:22:55  Dana Gasca  Rectocele 13-Jun-2019 12:22:47  Dana Gasca  Uterovaginal prolapse 13-Jun-2019 12:22:40  Dana Gasca

## 2019-06-13 NOTE — PROGRESS NOTE ADULT - SUBJECTIVE AND OBJECTIVE BOX
POST-OP CHECK    Allergies  No Known Allergies    Intolerances  lactose (Diarrhea)      S: Pt sleeping, easily arousable. Pain controlled. Pt denies N/V, SOB, CP, palpitations.    O:   T(C): 36.2 (06-13-19 @ 12:06), Max: 36.2 (06-13-19 @ 12:06)  HR: 49 (06-13-19 @ 14:00) (46 - 64)  BP: 134/60 (06-13-19 @ 14:00) (118/58 - 139/63)  RR: 18 (06-13-19 @ 14:00) (18 - 20)  SpO2: 98% (06-13-19 @ 14:00) (97% - 100%)  I&O's Summary    13 Jun 2019 07:01  -  13 Jun 2019 14:10  --------------------------------------------------------  IN: 75 mL / OUT: 40 mL / NET: 35 mL        Gen:  CV: S1S2, RRR  Lungs: CTA B/L  Abd: soft, appropriately tender, occassional BS x 4 quadrants  Inc: Clean/dry/iintact  : smith in place  Ext: PAS in place and functioning, Neg Homans B/L. no swelling, redness noted    A/P: 64y Female S/P with PMHx of:  Gastritis  Lactose intolerance  H/O Clostridium difficile infection: treated 1 year ago  Diverticulitis: resolved  Renal stone: in 2013, passed without surgical intervention  HLD (hyperlipidemia)  HTN (hypertension)  Hyperthyroidism: s/p radiation 1994  H/O lymph node biopsy: right cervical lymph node biopsy - negative  2005  S/P cholecystectomy      -Continue routine care  -Analgesia PRN  -OOB with assistance x 2, then Ad Serenity  -Meds:   acetaminophen  IVPB .. 1000 milliGRAM(s) IV Intermittent every 6 hours  benzocaine 15 mG/menthol 3.6 mG Lozenge 1 Lozenge Oral three times a day PRN  cefoTEtan  IVPB 2 Gram(s) IV Intermittent once  docusate sodium 100 milliGRAM(s) Oral three times a day PRN  HYDROmorphone  Injectable 0.5 milliGRAM(s) IV Push every 10 minutes PRN  ketorolac   Injectable 30 milliGRAM(s) IV Push every 6 hours  lactated ringers. 1000 milliLiter(s) IV Continuous <Continuous>  ondansetron Injectable 4 milliGRAM(s) IV Push once PRN  oxyCODONE    IR 5 milliGRAM(s) Oral every 4 hours PRN  oxyCODONE    IR 10 milliGRAM(s) Oral every 6 hours PRN        -CV: hemodynically stable-  -Resp: no active issues, Incentive spirometer at bedside  -GI: Advance as tolerated  - : Smith to gravity. Void trial in AM.  -DVT PPX: SCDs  Pain Management: Tyl/Motrin/Oxy  -Dispo: Admit to GYN    Faustina Castellanos PA-C

## 2019-06-13 NOTE — PRE-ANESTHESIA EVALUATION ADULT - NSANTHADDINFOFT_GEN_ALL_CORE
Discussed GA in detail with patient and all questions sought and answered. Pt. agrees to all plans and wishes to proceed with surgical care. Discussed GA in detail with patient and all questions sought and answered. Pt. agrees to all plans and wishes to proceed with surgical care.    Medical optimization and clearance noted and appreciated.

## 2019-06-13 NOTE — BRIEF OPERATIVE NOTE - NSICDXBRIEFPROCEDURE_GEN_ALL_CORE_FT
PROCEDURES:  Laparoscopic sacral colpopexy with mesh 13-Jun-2019 12:22:18  Dana Gasca  Bilateral salpingoophorectomy 13-Jun-2019 12:22:09  Dana Gasca  Robot-assisted laparoscopic supracervical hysterectomy with cystoscopy 13-Jun-2019 12:21:47  Dana Gasca PROCEDURES:  Retropubic sling procedure using transvaginal tape for female stress incontinence with cystoscopy 13-Jun-2019 13:22:32  Alaina Bales  Bilateral salpingectomy with oophorectomy 13-Jun-2019 13:22:21  Alaina Bales  Robot-assisted laparoscopic supracervical hysterectomy with sacrocolpopexy 13-Jun-2019 13:22:10  Alaina Bales

## 2019-06-13 NOTE — BRIEF OPERATIVE NOTE - OPERATION/FINDINGS
~6cm uterus, grossly normal appearing bilateral fallopian tubes and ovaries  Sacral colpopexy with BS mesh  Cystoscopy confirmed bilateral ureteral jets, no evidence of bladder injury

## 2019-06-13 NOTE — BRIEF OPERATIVE NOTE - NSICDXBRIEFPOSTOP_GEN_ALL_CORE_FT
POST-OP DIAGNOSIS:  Uterovaginal prolapse 13-Jun-2019 12:23:29  Dana Gasac POST-OP DIAGNOSIS:  Urinary, incontinence, stress female 13-Jun-2019 18:07:48  Alaina Bales  Uterovaginal prolapse 13-Jun-2019 12:23:29  Dana Gasca

## 2019-06-13 NOTE — PRE-ANESTHESIA EVALUATION ADULT - NSPROPOSEDPROCEDFT_GEN_ALL_CORE
Robotic laproscopic supracervical hysterectomy, bilateral SO, midurethral sling,sacral colpopexy,cystoscopy

## 2019-06-14 ENCOUNTER — TRANSCRIPTION ENCOUNTER (OUTPATIENT)
Age: 64
End: 2019-06-14

## 2019-06-14 VITALS — DIASTOLIC BLOOD PRESSURE: 71 MMHG | SYSTOLIC BLOOD PRESSURE: 112 MMHG | HEART RATE: 53 BPM

## 2019-06-14 LAB
BASOPHILS # BLD AUTO: 0 K/UL — SIGNIFICANT CHANGE UP (ref 0–0.2)
BASOPHILS NFR BLD AUTO: 0.2 % — SIGNIFICANT CHANGE UP (ref 0–2)
EOSINOPHIL # BLD AUTO: 0 K/UL — SIGNIFICANT CHANGE UP (ref 0–0.5)
EOSINOPHIL NFR BLD AUTO: 0.1 % — SIGNIFICANT CHANGE UP (ref 0–6)
HCT VFR BLD CALC: 36.6 % — SIGNIFICANT CHANGE UP (ref 34.5–45)
HGB BLD-MCNC: 13 G/DL — SIGNIFICANT CHANGE UP (ref 11.5–15.5)
LYMPHOCYTES # BLD AUTO: 1.8 K/UL — SIGNIFICANT CHANGE UP (ref 1–3.3)
LYMPHOCYTES # BLD AUTO: 13.1 % — SIGNIFICANT CHANGE UP (ref 13–44)
MCHC RBC-ENTMCNC: 31.2 PG — SIGNIFICANT CHANGE UP (ref 27–34)
MCHC RBC-ENTMCNC: 35.7 GM/DL — SIGNIFICANT CHANGE UP (ref 32–36)
MCV RBC AUTO: 87.3 FL — SIGNIFICANT CHANGE UP (ref 80–100)
MONOCYTES # BLD AUTO: 0.9 K/UL — SIGNIFICANT CHANGE UP (ref 0–0.9)
MONOCYTES NFR BLD AUTO: 6.4 % — SIGNIFICANT CHANGE UP (ref 2–14)
NEUTROPHILS # BLD AUTO: 10.7 K/UL — HIGH (ref 1.8–7.4)
NEUTROPHILS NFR BLD AUTO: 80.2 % — HIGH (ref 43–77)
PLATELET # BLD AUTO: 243 K/UL — SIGNIFICANT CHANGE UP (ref 150–400)
RBC # BLD: 4.19 M/UL — SIGNIFICANT CHANGE UP (ref 3.8–5.2)
RBC # FLD: 11.3 % — SIGNIFICANT CHANGE UP (ref 10.3–14.5)
WBC # BLD: 13.3 K/UL — HIGH (ref 3.8–10.5)
WBC # FLD AUTO: 13.3 K/UL — HIGH (ref 3.8–10.5)

## 2019-06-14 RX ORDER — ACETAMINOPHEN 500 MG
3 TABLET ORAL
Qty: 0 | Refills: 0 | DISCHARGE
Start: 2019-06-14

## 2019-06-14 RX ORDER — IBUPROFEN 200 MG
600 TABLET ORAL EVERY 6 HOURS
Refills: 0 | Status: DISCONTINUED | OUTPATIENT
Start: 2019-06-14 | End: 2019-06-14

## 2019-06-14 RX ORDER — IBUPROFEN 200 MG
1 TABLET ORAL
Qty: 0 | Refills: 0 | DISCHARGE
Start: 2019-06-14

## 2019-06-14 RX ORDER — OXYCODONE HYDROCHLORIDE 5 MG/1
1 TABLET ORAL
Qty: 20 | Refills: 0
Start: 2019-06-14 | End: 2019-06-18

## 2019-06-14 RX ORDER — ACETAMINOPHEN 500 MG
975 TABLET ORAL EVERY 6 HOURS
Refills: 0 | Status: DISCONTINUED | OUTPATIENT
Start: 2019-06-14 | End: 2019-06-14

## 2019-06-14 RX ADMIN — PANTOPRAZOLE SODIUM 40 MILLIGRAM(S): 20 TABLET, DELAYED RELEASE ORAL at 07:48

## 2019-06-14 RX ADMIN — Medication 975 MILLIGRAM(S): at 12:47

## 2019-06-14 RX ADMIN — Medication 30 MILLIGRAM(S): at 00:30

## 2019-06-14 RX ADMIN — Medication 30 MILLIGRAM(S): at 06:36

## 2019-06-14 RX ADMIN — SIMETHICONE 80 MILLIGRAM(S): 80 TABLET, CHEWABLE ORAL at 07:48

## 2019-06-14 RX ADMIN — Medication 30 MILLIGRAM(S): at 07:12

## 2019-06-14 RX ADMIN — Medication 112 MICROGRAM(S): at 06:36

## 2019-06-14 NOTE — PROGRESS NOTE ADULT - PROBLEM SELECTOR PLAN 1
Neuro: transition to po pain meds  CV: Hemodynamically stable, continue atenolol for HTN  Pulm: Saturating well on room air, encourage incentive spirometry  GI: Advance to regular diet  : UOP adequate, TOV after tolerating breakfast  Heme: c/w SCDs for DVT ppx  Endo: continue synthroid for hypothyroidism  Dispo: Discharge planning pending TOV    Gilson Monsalve MD PGY2

## 2019-06-14 NOTE — DISCHARGE NOTE NURSING/CASE MANAGEMENT/SOCIAL WORK - NSDCPNINST_GEN_ALL_CORE
Keep your appointment with doctors as scheduled and call if you have any signs of infection, fever, chills, difficulty moving your bowels, scope sites draining, swollen, reddened, warm to touch Keep your appointment with doctor as instructed  and call if you have any signs of infection, fever, chills, difficulty urinating or moving your bowels, scope sites draining, swollen, reddened, warm to touch,  heavy vaginal bleeding or clots, strong vaginal odor, excessive pain, unable to tolerate food, chest discomfort or with any questions or concerns. No heavy lifting or strenuous activity and nothing per vagina- douching, sex or tub baths and no driving until cleared by doctor. No creams or lotions to scope sites.

## 2019-06-14 NOTE — PROGRESS NOTE ADULT - SUBJECTIVE AND OBJECTIVE BOX
R2 Note    HD#2  POD#1    INTERVAL HPI/OVERNIGHT EVENTS: Pt seen and examined at bedside.  Pt complains of nausea overnight, now resolved.  Minimally ambulating, not yet passing flatus, tolerating clears, pain controlled with analgesia, smith catheter in place  She denies nausea/vomiting/fever/chills/chest pain/SOB/dizziness.    MEDICATIONS  (STANDING):  ATENolol  Tablet 50 milliGRAM(s) Oral daily  cefoTEtan  IVPB 2 Gram(s) IV Intermittent once  ketorolac   Injectable 30 milliGRAM(s) IV Push every 6 hours  lactated ringers. 1000 milliLiter(s) (75 mL/Hr) IV Continuous <Continuous>  levothyroxine 112 MICROGram(s) Oral daily  pantoprazole    Tablet 40 milliGRAM(s) Oral before breakfast    MEDICATIONS  (PRN):  benzocaine 15 mG/menthol 3.6 mG Lozenge 1 Lozenge Oral three times a day PRN Sore Throat  docusate sodium 100 milliGRAM(s) Oral three times a day PRN Stool Softening  oxyCODONE    IR 5 milliGRAM(s) Oral every 4 hours PRN Moderate Pain (4 - 6)  oxyCODONE    IR 10 milliGRAM(s) Oral every 6 hours PRN Severe Pain (7 - 10)  simethicone 80 milliGRAM(s) Chew two times a day PRN Gas      12 point ROS negative except as outlined above    Vital Signs Last 24 Hrs  T(C): 36.7 (14 Jun 2019 05:25), Max: 36.9 (13 Jun 2019 07:19)  T(F): 98 (14 Jun 2019 05:25), Max: 98.1 (13 Jun 2019 22:22)  HR: 54 (14 Jun 2019 05:25) (46 - 64)  BP: 114/70 (14 Jun 2019 05:25) (98/60 - 145/84)  BP(mean): 82 (13 Jun 2019 15:00) (82 - 92)  RR: 18 (14 Jun 2019 05:25) (16 - 20)  SpO2: 95% (14 Jun 2019 05:25) (95% - 100%)    I&O's Summary    13 Jun 2019 07:01  -  14 Jun 2019 06:38  --------------------------------------------------------  IN: 300 mL / OUT: 1270 mL / NET: -970 mL          PHYSICAL EXAM:    GA: NAD, A+0 x 3  CV: RRR  Pulm: CTA BL  Abd: ( + ) BS, soft, appropriately tender, nondistended, no rebound or guarding,   Incision: clean, dry and intact dermabond in over incision  : no bleeding, smith in place  Extremities: no swelling or calf tenderness

## 2019-06-14 NOTE — DISCHARGE NOTE NURSING/CASE MANAGEMENT/SOCIAL WORK - NSDCDPATPORTLINK_GEN_ALL_CORE
You can access the [facility] Patient Portal, offered by [facility], by registering with the following website: [email or web address]

## 2019-06-14 NOTE — CHART NOTE - NSCHARTNOTEFT_GEN_A_CORE
LETICIA KENDRICK    Pt seen for voiding trial. Ms. Gievns reports that she tolerated breakfast well and has no nausea/vomiting. She continues to have some mild gas pain but has been able to ambulate without difficulty. She also reports pain well controlled.     Smith bag was discontinued. 300cc of NS passively infused into the bladder with patient in supine position. Pt then ambulated to the bathroom where smith catheter was removed. Pt spontaneously voided 300cc of clear urine/NS into the hat immediately.    She will be discharged home with appropriate follow-up.  d/w Dr. Bales.  AROLDO Holliday

## 2019-06-14 NOTE — DISCHARGE NOTE PROVIDER - NSDCCPTREATMENT_GEN_ALL_CORE_FT
PRINCIPAL PROCEDURE  Procedure: Robot-assisted laparoscopic supracervical hysterectomy with sacrocolpopexy  Findings and Treatment:

## 2019-06-14 NOTE — DISCHARGE NOTE PROVIDER - CARE PROVIDER_API CALL
Zurdo Saucedo (MD)  Female Pelvic MedReconst Surg; Obstetrics and Gynecology  [address]  [place] [address]  Phone: [phone]  Fax: [phone]  Follow Up Time:

## 2019-06-14 NOTE — PROGRESS NOTE ADULT - ASSESSMENT
Pt is a 65 yo POD#1 status post robotic assisted laparoscopic Supracervical Hysterectomy, sacrocolpopexy, and midurethral sling, recovering appropriately. Pt is a 65 yo POD#1 status post robotic assisted laparoscopic Supracervical Hysterectomy, sacrocolpopexy, and midurethral sling, recovering appropriately.     Urogyn Fellow Note    Patient seen and examined. Patient recovering well POD1 after RA-WILLA/BSO, SC, retropubic MUS and cystoscopy. VSS, exam benign, H/H pending.   - f/u am cbc  - encourage ambulation  - regular diet  - PO pain meds  - TOV today  - anticipate d/c home. D/c instructions reviewed.

## 2019-06-14 NOTE — DISCHARGE NOTE PROVIDER - HOSPITAL COURSE
Pt is a 63yo POD#1 s/p robotic assisted laparoscopic Supracervical Hysterectomy, sacrocolpopexy, and midurethral sling. See operative note for details. On day of discharge, patient passed her TOV and was voiding spontaneously. She was ambulating and tolerating PO. Pain well controlled. Plan to follow up with Dr. Saucedo in 2 weeks.

## 2019-06-25 LAB — SURGICAL PATHOLOGY STUDY: SIGNIFICANT CHANGE UP

## 2019-06-25 PROCEDURE — 88305 TISSUE EXAM BY PATHOLOGIST: CPT

## 2019-06-25 PROCEDURE — S2900: CPT

## 2019-06-25 PROCEDURE — 85027 COMPLETE CBC AUTOMATED: CPT

## 2019-06-25 PROCEDURE — C1771: CPT

## 2019-06-25 PROCEDURE — 86900 BLOOD TYPING SEROLOGIC ABO: CPT

## 2019-06-25 PROCEDURE — 86901 BLOOD TYPING SEROLOGIC RH(D): CPT

## 2019-06-25 PROCEDURE — C1781: CPT

## 2019-06-25 PROCEDURE — 82962 GLUCOSE BLOOD TEST: CPT

## 2019-07-01 ENCOUNTER — APPOINTMENT (OUTPATIENT)
Dept: UROGYNECOLOGY | Facility: CLINIC | Age: 64
End: 2019-07-01
Payer: COMMERCIAL

## 2019-07-01 PROBLEM — K57.92 DIVERTICULITIS OF INTESTINE, PART UNSPECIFIED, WITHOUT PERFORATION OR ABSCESS WITHOUT BLEEDING: Chronic | Status: ACTIVE | Noted: 2019-05-26

## 2019-07-01 PROBLEM — I10 ESSENTIAL (PRIMARY) HYPERTENSION: Chronic | Status: ACTIVE | Noted: 2019-05-26

## 2019-07-01 PROBLEM — E05.90 THYROTOXICOSIS, UNSPECIFIED WITHOUT THYROTOXIC CRISIS OR STORM: Chronic | Status: ACTIVE | Noted: 2019-05-26

## 2019-07-01 PROBLEM — K29.70 GASTRITIS, UNSPECIFIED, WITHOUT BLEEDING: Chronic | Status: ACTIVE | Noted: 2019-06-03

## 2019-07-01 PROBLEM — E73.9 LACTOSE INTOLERANCE, UNSPECIFIED: Chronic | Status: ACTIVE | Noted: 2019-06-03

## 2019-07-01 PROBLEM — N20.0 CALCULUS OF KIDNEY: Chronic | Status: ACTIVE | Noted: 2019-05-26

## 2019-07-01 PROBLEM — E78.5 HYPERLIPIDEMIA, UNSPECIFIED: Chronic | Status: ACTIVE | Noted: 2019-05-26

## 2019-07-01 PROBLEM — Z86.19 PERSONAL HISTORY OF OTHER INFECTIOUS AND PARASITIC DISEASES: Chronic | Status: ACTIVE | Noted: 2019-05-26

## 2019-07-01 PROCEDURE — 99024 POSTOP FOLLOW-UP VISIT: CPT | Mod: GC

## 2019-07-01 NOTE — DISCUSSION/SUMMARY
[Post-Op instructions given. Pt/family verbalizes understanding] : post-operative instructions were provided to the patient/family who verbalize understanding [Risks/Benefits discussed. Pt/family verbalizes understanding] : risks and benefits of the procedure were discussed with the patient/family who verbalize understanding [FreeTextEntry1] : 65yo s/p RA-WILLA/BSO, SC, retropubic sling and cystoscopy, ~2 weeks postop doing well. We reiviewed postoperative restrictions. She will return in 4 weeks for follow up.

## 2019-07-01 NOTE — OBJECTIVE
[Post Void Residual ____ ml] : Post Void Residual was [unfilled] ml [Soft and Nontender] : soft and nontender [Clean, Dry, Intact] : Clean, Dry, Intact [Good Support] : Good support [Healing well] : healing well [FreeTextEntry3] : No mesh exposure.

## 2019-07-01 NOTE — SUBJECTIVE
[FreeTextEntry1] : NAD [FreeTextEntry8] : none [FreeTextEntry7] : none  [FreeTextEntry6] : normal [FreeTextEntry5] : Experiences some post-void dribbling. Denies urgency, frequency, dysuria.  [FreeTextEntry4] : Normal [FreeTextEntry3] : Normal [FreeTextEntry2] : Normal

## 2019-08-05 ENCOUNTER — APPOINTMENT (OUTPATIENT)
Dept: UROGYNECOLOGY | Facility: CLINIC | Age: 64
End: 2019-08-05
Payer: COMMERCIAL

## 2019-08-05 PROCEDURE — 99024 POSTOP FOLLOW-UP VISIT: CPT

## 2019-08-05 NOTE — OBJECTIVE
[Clean, Dry, Intact] : Clean, Dry, Intact [Healing well] : healing well [Good Support] : Good support [FreeTextEntry2] : Cervix appears normal.  [FreeTextEntry3] : No mesh, sutures visible

## 2019-08-05 NOTE — SUBJECTIVE
[FreeTextEntry1] : Doing well  [FreeTextEntry8] : None  [FreeTextEntry7] : None [FreeTextEntry6] : Normal no nausea, vomiting [FreeTextEntry5] : Occasional leakage noticed on pantyliner. Denies dysuria, frequency, urgency. Denies hematuria.  [FreeTextEntry4] : Normal [FreeTextEntry3] : Normal [FreeTextEntry2] : Well.

## 2019-08-05 NOTE — ASSESSMENT
[FreeTextEntry1] : 63yo s/p RASCH, SCP, BSO, retropubic sling, cystoscopy on 6/13/19 presents for 6 week follow up. Doing well. May return to normal activities. F/u as needed

## 2019-09-02 NOTE — ED PROVIDER NOTE - PRINCIPAL DIAGNOSIS
I, Matheus Bowling MD,  performed the initial face to face bedside interview with this patient regarding history of present illness, review of symptoms and relevant past medical, social and family history.  I completed an independent physical examination.    The history, relevant review of systems, past medical and surgical history, medical decision making, and physical examination was documented by the scribe in my presence and I attest to the accuracy of the documentation.
Diarrhea, unspecified type

## 2019-09-03 NOTE — H&P PST ADULT - NS HIV RISK FACTOR
jasbir is requesting a refill on sildenafil citrate 50g patients last office visit was on 09/06/2017 patient will need to make an office appointment before further fefills can be approved.
No

## 2019-10-03 DIAGNOSIS — Z01.818 ENCOUNTER FOR OTHER PREPROCEDURAL EXAMINATION: ICD-10-CM

## 2019-11-26 LAB — CORE LAB BIOPSY: NORMAL

## 2021-04-26 NOTE — DISCHARGE NOTE PROVIDER - NSDCQMCOGNITION_NEU_ALL_CORE
No difficulties
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

## 2021-06-15 NOTE — PROGRESS NOTE ADULT - PROVIDER SPECIALTY LIST ADULT
Internal Medicine Instructions: This plan will send the code FBSE to the PM system.  DO NOT or CHANGE the price. Body Of Note (Please Add Your Own Text Here): S/p TBSE today Detail Level: Simple Price (Do Not Change): 0.00

## 2023-05-16 ENCOUNTER — NON-APPOINTMENT (OUTPATIENT)
Age: 68
End: 2023-05-16

## 2023-05-17 ENCOUNTER — APPOINTMENT (OUTPATIENT)
Dept: UROGYNECOLOGY | Facility: CLINIC | Age: 68
End: 2023-05-17
Payer: MEDICARE

## 2023-05-17 VITALS
HEIGHT: 64.5 IN | DIASTOLIC BLOOD PRESSURE: 90 MMHG | SYSTOLIC BLOOD PRESSURE: 166 MMHG | BODY MASS INDEX: 31.2 KG/M2 | WEIGHT: 185 LBS

## 2023-05-17 DIAGNOSIS — Z82.49 FAMILY HISTORY OF ISCHEMIC HEART DISEASE AND OTHER DISEASES OF THE CIRCULATORY SYSTEM: ICD-10-CM

## 2023-05-17 DIAGNOSIS — Z87.42 PERSONAL HISTORY OF OTHER DISEASES OF THE FEMALE GENITAL TRACT: ICD-10-CM

## 2023-05-17 DIAGNOSIS — N32.81 OVERACTIVE BLADDER: ICD-10-CM

## 2023-05-17 DIAGNOSIS — N81.11 CYSTOCELE, MIDLINE: ICD-10-CM

## 2023-05-17 DIAGNOSIS — Z82.3 FAMILY HISTORY OF STROKE: ICD-10-CM

## 2023-05-17 DIAGNOSIS — N39.41 URGE INCONTINENCE: ICD-10-CM

## 2023-05-17 DIAGNOSIS — Z87.19 PERSONAL HISTORY OF OTHER DISEASES OF THE DIGESTIVE SYSTEM: ICD-10-CM

## 2023-05-17 DIAGNOSIS — Z86.39 PERSONAL HISTORY OF OTHER ENDOCRINE, NUTRITIONAL AND METABOLIC DISEASE: ICD-10-CM

## 2023-05-17 DIAGNOSIS — Z81.8 FAMILY HISTORY OF OTHER MENTAL AND BEHAVIORAL DISORDERS: ICD-10-CM

## 2023-05-17 DIAGNOSIS — N81.2 INCOMPLETE UTEROVAGINAL PROLAPSE: ICD-10-CM

## 2023-05-17 DIAGNOSIS — Z98.890 OTHER SPECIFIED POSTPROCEDURAL STATES: ICD-10-CM

## 2023-05-17 DIAGNOSIS — N39.3 STRESS INCONTINENCE (FEMALE) (MALE): ICD-10-CM

## 2023-05-17 LAB
BILIRUB UR QL STRIP: NORMAL
CLARITY UR: CLEAR
COLLECTION METHOD: NORMAL
GLUCOSE UR-MCNC: NORMAL
HCG UR QL: 0.2 EU/DL
HGB UR QL STRIP.AUTO: NORMAL
KETONES UR-MCNC: NORMAL
LEUKOCYTE ESTERASE UR QL STRIP: NORMAL
NITRITE UR QL STRIP: NORMAL
PH UR STRIP: 5.5
PROT UR STRIP-MCNC: NORMAL
SP GR UR STRIP: 1.01

## 2023-05-17 PROCEDURE — 99204 OFFICE O/P NEW MOD 45 MIN: CPT | Mod: 25

## 2023-05-17 PROCEDURE — 51701 INSERT BLADDER CATHETER: CPT

## 2023-05-17 PROCEDURE — 81003 URINALYSIS AUTO W/O SCOPE: CPT | Mod: QW

## 2023-05-17 RX ORDER — POLYVINYL ALCOHOL, POVIDONE 14; 6 MG/ML; MG/ML
SOLUTION/ DROPS OPHTHALMIC
Refills: 0 | Status: ACTIVE | COMMUNITY

## 2023-05-17 RX ORDER — MULTIVIT-MIN/IRON/FOLIC ACID/K 18-600-40
CAPSULE ORAL
Refills: 0 | Status: ACTIVE | COMMUNITY

## 2023-05-17 RX ORDER — SOLIFENACIN SUCCINATE 5 MG/1
5 TABLET ORAL DAILY
Qty: 30 | Refills: 1 | Status: ACTIVE | COMMUNITY
Start: 2023-05-17 | End: 1900-01-01

## 2023-05-17 RX ORDER — LANSOPRAZOLE 15 MG/1
15 CAPSULE, DELAYED RELEASE ORAL
Refills: 0 | Status: ACTIVE | COMMUNITY

## 2023-05-17 NOTE — DISCUSSION/SUMMARY
[FreeTextEntry1] : I reviewed the above findings with the patient for her urinary complaints we discussed likely overactive bladder. We discussed first line therapies, including Kegel's exercises and behavioral modification. We discussed medications. We also  discussed second and third line therapies, including the InterStim procedure, Botox, and PTNS.  Would like to try medication\par We discussed using an anticholinergic as her blood pressure was elevated today.  She will follow-up with her medical doctor for the elevated blood pressure.  If she does have a good response on Solifenacin and her blood pressure gets better under better control we can discuss switching over to a beta agonist\par Treatment options for the prolapse were discussed and included doing nothing, Kegel exercises and behavioral modification, a pessary, or surgical correction.  For the prolapse he would like to start with conservative management.\par I UG a patient information on pelvic organ prolapse, overactive bladder, and drug education was given to her.  I have asked her to follow-up in the office in approximately 1 month.  All questions were answered.

## 2023-05-17 NOTE — HISTORY OF PRESENT ILLNESS
[Vaginal Wall Prolapse] : no [Rectal Prolapse] : no [Unable To Restrain Bowel Movement] : no [] : years ago [Urinary Frequency] : no [Urinary Tract Infection] : no [x2] : nocturia two times a night [de-identified] : daily  [de-identified] : 1 [de-identified] : daily  [de-identified] : no vaginal intercourse in last year - partner ill. [FreeTextEntry9] : sometimes 20 minutes later [FreeTextEntry1] : pt s/p Lap/evert WILLA, BSO, SCP, retropubic sling in 2019 \par pt had UTI in arch with gross hematuria - resolved with Keflex

## 2023-05-17 NOTE — PHYSICAL EXAM
[Chaperone Present] : A chaperone was present in the examining room during all aspects of the physical examination [Normal Appearance] : general appearance was normal [Atrophy] : atrophy [Cystocele] : a cystocele [Aa ____] : Aa [unfilled] [Ba ____] : Ba [unfilled] [C ____] : C [unfilled] [GH ____] : GH [unfilled] [PB ____] : PB [unfilled] [TVL ____] : TVL  [unfilled] [Ap ____] : Ap [unfilled] [Bp ____] : Bp [unfilled] [Post Void Residual ____ml] : post void residual was [unfilled] ml [No Acute Distress] : in no acute distress [Well developed] : well developed [Well Nourished] : ~L well nourished [Normal Mood/Affect] : mood and affect are normal [Warm and Dry] : was warm and dry to touch [Normal] : normal external genitalia [Vulvar Atrophy] : vulvar atrophy [Absent] : absent [Anxiety] : patient is not anxious [Tenderness] : ~T no ~M abdominal tenderness observed [Distended] : not distended [FreeTextEntry3] : Positive hypermobility

## 2023-05-17 NOTE — PROCEDURE
[FreeTextEntry1] : A catheterized urine was performed to rule out urinary tract infection and/or retention.  We discussed that urine dipstick showed trace blood and we will send off for urinalysis and culture

## 2023-05-18 LAB
APPEARANCE: CLEAR
BACTERIA: NEGATIVE /HPF
BILIRUBIN URINE: NEGATIVE
BLOOD URINE: NEGATIVE
CAST: 0 /LPF
COLOR: YELLOW
EPITHELIAL CELLS: 2 /HPF
GLUCOSE QUALITATIVE U: NEGATIVE MG/DL
KETONES URINE: NEGATIVE MG/DL
LEUKOCYTE ESTERASE URINE: NEGATIVE
MICROSCOPIC-UA: NORMAL
NITRITE URINE: NEGATIVE
PH URINE: 6
PROTEIN URINE: NEGATIVE MG/DL
RED BLOOD CELLS URINE: 2 /HPF
SPECIFIC GRAVITY URINE: 1.01
UROBILINOGEN URINE: 0.2 MG/DL
WHITE BLOOD CELLS URINE: 0 /HPF

## 2023-05-19 LAB — BACTERIA UR CULT: NORMAL

## 2023-05-23 RX ORDER — TROSPIUM CHLORIDE 60 MG/1
60 CAPSULE, EXTENDED RELEASE ORAL
Qty: 30 | Refills: 1 | Status: ACTIVE | COMMUNITY
Start: 2023-05-23 | End: 1900-01-01

## 2023-07-11 ENCOUNTER — APPOINTMENT (OUTPATIENT)
Dept: UROGYNECOLOGY | Facility: CLINIC | Age: 68
End: 2023-07-11

## 2024-07-09 NOTE — PATIENT PROFILE ADULT - NAME OF PERSON WHO ASSISTED
Vital Signs Last 24 Hrs  T(C): 36.6 (09 Jul 2024 12:02), Max: 39.2 (08 Jul 2024 20:38)  T(F): 97.9 (09 Jul 2024 12:02), Max: 102.5 (08 Jul 2024 20:38)  HR: 78 (09 Jul 2024 12:02) (67 - 88)  BP: 106/65 (09 Jul 2024 12:02) (91/69 - 130/107)  BP(mean): --  RR: 18 (09 Jul 2024 12:02) (18 - 22)  SpO2: 98% (09 Jul 2024 12:02) (94% - 98%)    Parameters below as of 09 Jul 2024 12:02  Patient On (Oxygen Delivery Method): nasal cannula  O2 Flow (L/min): 2      PHYSICAL EXAM:  GENERAL:  Lethargic, NAD  HEAD:  NCAT  EYES: conjunctiva clear  NECK: Supple, No JVD  CHEST/LUNG: No accessory muscle use. No w/r/r.  HEART: Reg rate and rhythm  ABDOMEN: SNTND, BS normoactive  EXTREMITIES:   No clubbing, cyanosis, edema.  PSYCH: AAOx2 to person and place  NEUROLOGY: grossly non-focal, moving all extremities spontaneously  SKIN: No rashes or lesions
n/a

## 2025-01-03 NOTE — PRE-OP CHECKLIST - WAS PATIENT ON BETA BLOCKER?
Chronic, controlled. Has had since childhood, only uses it once in a while  Refill placed.  Orders:    albuterol (Ventolin HFA) 90 mcg/act inhaler; Inhale 2 puffs every 6 (six) hours as needed for wheezing     Yes

## 2025-01-29 NOTE — ED PROVIDER NOTE - NS HIV RISK FACTOR YES
Spoke to dad, need [place] form for parent. Will leave one at  for mom to  , fill out, and return to us. Dr. Carlos will score forms and we will schedule an appointment to discuss. Dad said he will let mom know.  
Declined

## 2025-02-11 NOTE — PRE-ANESTHESIA EVALUATION ADULT - BP NONINVASIVE SYSTOLIC (MM HG)
N: Intact, pain well controlled. Tolerated 2 walks around 4th floor with SBA.   C: Hemodynamically stable. No edema. Afebrile.   R: RA. Demonstrates proper technique with IS - encouraged to continue using at home.   GI: Feeds via J tube overnight. BM today   : Voiding without difficulty  SKIN: Incisions WDL  ACCESS: port hep locked and de accessed.     Discharged home with brothers and belongings, including cell phone and . AVS provided and discussed with teach back including: incisional care, signs/symptoms of infections, when to seek medical attention, medication administration and follow up appointments. Also demonstrated J tube med and lovenox self administration.    145

## 2025-02-25 NOTE — PACU DISCHARGE NOTE - HYDRATION STATUS:
OB?  No  Pharmacy Verified? Yes   Reason for Call: refill, Patient is calling that the medication that was sent today to [facility] is out of stock. So Patient would like medication to be sent to [facility] in [place]. Please advise. Thank you   Best form of Contact:    Cell Phone:   Telephone Information:   Mobile [phone]     Okay to leave a detailed message regarding call? Yes    Satisfactory